# Patient Record
Sex: MALE | Race: WHITE | HISPANIC OR LATINO | Employment: OTHER | URBAN - METROPOLITAN AREA
[De-identification: names, ages, dates, MRNs, and addresses within clinical notes are randomized per-mention and may not be internally consistent; named-entity substitution may affect disease eponyms.]

---

## 2024-07-21 ENCOUNTER — APPOINTMENT (OUTPATIENT)
Dept: CT IMAGING | Facility: HOSPITAL | Age: 34
End: 2024-07-21

## 2024-07-21 ENCOUNTER — HOSPITAL ENCOUNTER (INPATIENT)
Facility: HOSPITAL | Age: 34
LOS: 4 days | Discharge: HOME | End: 2024-07-25
Attending: EMERGENCY MEDICINE | Admitting: INTERNAL MEDICINE

## 2024-07-21 ENCOUNTER — APPOINTMENT (OUTPATIENT)
Dept: GENERAL RADIOLOGY | Facility: HOSPITAL | Age: 34
End: 2024-07-21

## 2024-07-21 ENCOUNTER — APPOINTMENT (OUTPATIENT)
Dept: NEUROLOGY | Facility: HOSPITAL | Age: 34
End: 2024-07-21

## 2024-07-21 DIAGNOSIS — R41.82 ALTERED MENTAL STATUS, UNSPECIFIED ALTERED MENTAL STATUS TYPE: Primary | ICD-10-CM

## 2024-07-21 DIAGNOSIS — F19.10 POLYSUBSTANCE ABUSE: ICD-10-CM

## 2024-07-21 DIAGNOSIS — F10.929 ALCOHOLIC INTOXICATION WITH COMPLICATION: ICD-10-CM

## 2024-07-21 DIAGNOSIS — E87.29 HIGH ANION GAP METABOLIC ACIDOSIS: ICD-10-CM

## 2024-07-21 DIAGNOSIS — J18.9 COMMUNITY ACQUIRED PNEUMONIA, UNSPECIFIED LATERALITY: ICD-10-CM

## 2024-07-21 LAB
ALBUMIN SERPL-MCNC: 4.4 G/DL (ref 3.5–5.2)
ALBUMIN/GLOB SERPL: 1.4 G/DL
ALP SERPL-CCNC: 96 U/L (ref 39–117)
ALT SERPL W P-5'-P-CCNC: 45 U/L (ref 1–41)
AMPHET+METHAMPHET UR QL: NEGATIVE
ANION GAP SERPL CALCULATED.3IONS-SCNC: 24.3 MMOL/L (ref 5–15)
APAP SERPL-MCNC: <5 MCG/ML (ref 0–30)
APPEARANCE CSF: CLEAR
APPEARANCE CSF: CLEAR
ARTERIAL PATENCY WRIST A: POSITIVE
ARTERIAL PATENCY WRIST A: POSITIVE
AST SERPL-CCNC: 46 U/L (ref 1–40)
ATMOSPHERIC PRESS: 749.2 MMHG
ATMOSPHERIC PRESS: 750.9 MMHG
B PARAPERT DNA SPEC QL NAA+PROBE: NOT DETECTED
B PERT DNA SPEC QL NAA+PROBE: NOT DETECTED
BACTERIA UR QL AUTO: NORMAL /HPF
BARBITURATES UR QL SCN: NEGATIVE
BASE EXCESS BLDA CALC-SCNC: -12.4 MMOL/L (ref 0–2)
BASE EXCESS BLDA CALC-SCNC: -7.9 MMOL/L (ref 0–2)
BASOPHILS # BLD AUTO: 0.12 10*3/MM3 (ref 0–0.2)
BASOPHILS NFR BLD AUTO: 0.7 % (ref 0–1.5)
BDY SITE: ABNORMAL
BDY SITE: ABNORMAL
BENZODIAZ UR QL SCN: POSITIVE
BILIRUB SERPL-MCNC: 0.3 MG/DL (ref 0–1.2)
BILIRUB UR QL STRIP: NEGATIVE
BUN SERPL-MCNC: 8 MG/DL (ref 8–23)
BUN/CREAT SERPL: 7.3 (ref 7–25)
C PNEUM DNA NPH QL NAA+NON-PROBE: NOT DETECTED
CALCIUM SPEC-SCNC: 8 MG/DL (ref 8.2–9.6)
CANNABINOIDS SERPL QL: NEGATIVE
CHLORIDE SERPL-SCNC: 103 MMOL/L (ref 98–107)
CLARITY UR: CLEAR
CO2 BLDA-SCNC: 19.3 MMOL/L (ref 23–27)
CO2 BLDA-SCNC: 20.4 MMOL/L (ref 23–27)
CO2 SERPL-SCNC: 13.7 MMOL/L (ref 22–29)
COCAINE UR QL: POSITIVE
COLOR CSF: COLORLESS
COLOR CSF: COLORLESS
COLOR UR: YELLOW
CREAT SERPL-MCNC: 1.09 MG/DL (ref 0.76–1.27)
D-LACTATE SERPL-SCNC: 3.1 MMOL/L (ref 0.5–2)
D-LACTATE SERPL-SCNC: 3.5 MMOL/L (ref 0.5–2)
D-LACTATE SERPL-SCNC: 3.6 MMOL/L (ref 0.5–2)
D-LACTATE SERPL-SCNC: 9 MMOL/L (ref 0.5–2)
DEPRECATED RDW RBC AUTO: 44.6 FL (ref 37–54)
DEVICE COMMENT: ABNORMAL
DEVICE COMMENT: ABNORMAL
EGFRCR SERPLBLD CKD-EPI 2021: 36.2 ML/MIN/1.73
EOSINOPHIL # BLD AUTO: 0.16 10*3/MM3 (ref 0–0.4)
EOSINOPHIL NFR BLD AUTO: 0.9 % (ref 0.3–6.2)
ERYTHROCYTE [DISTWIDTH] IN BLOOD BY AUTOMATED COUNT: 13.1 % (ref 12.3–15.4)
ETHANOL BLD-MCNC: 183 MG/DL (ref 0–10)
ETHANOL UR QL: 0.18 %
FENTANYL UR-MCNC: POSITIVE NG/ML
FLUAV SUBTYP SPEC NAA+PROBE: NOT DETECTED
FLUBV RNA ISLT QL NAA+PROBE: NOT DETECTED
GLOBULIN UR ELPH-MCNC: 3.1 GM/DL
GLUCOSE BLDC GLUCOMTR-MCNC: 103 MG/DL (ref 70–130)
GLUCOSE BLDC GLUCOMTR-MCNC: 149 MG/DL (ref 70–130)
GLUCOSE BLDC GLUCOMTR-MCNC: 336 MG/DL (ref 70–130)
GLUCOSE BLDC GLUCOMTR-MCNC: 98 MG/DL (ref 70–130)
GLUCOSE CSF-MCNC: 95 MG/DL (ref 40–70)
GLUCOSE SERPL-MCNC: 378 MG/DL (ref 65–99)
GLUCOSE UR STRIP-MCNC: ABNORMAL MG/DL
HADV DNA SPEC NAA+PROBE: NOT DETECTED
HBA1C MFR BLD: 6.2 % (ref 4.8–5.6)
HCO3 BLDA-SCNC: 17.5 MMOL/L (ref 22–28)
HCO3 BLDA-SCNC: 19.1 MMOL/L (ref 22–28)
HCOV 229E RNA SPEC QL NAA+PROBE: NOT DETECTED
HCOV HKU1 RNA SPEC QL NAA+PROBE: NOT DETECTED
HCOV NL63 RNA SPEC QL NAA+PROBE: NOT DETECTED
HCOV OC43 RNA SPEC QL NAA+PROBE: NOT DETECTED
HCT VFR BLD AUTO: 45.7 % (ref 37.5–51)
HEMODILUTION: NO
HEMODILUTION: NO
HGB BLD-MCNC: 14.8 G/DL (ref 13–17.7)
HGB UR QL STRIP.AUTO: ABNORMAL
HMPV RNA NPH QL NAA+NON-PROBE: NOT DETECTED
HOLD SPECIMEN: NORMAL
HOLD SPECIMEN: NORMAL
HPIV1 RNA ISLT QL NAA+PROBE: NOT DETECTED
HPIV2 RNA SPEC QL NAA+PROBE: NOT DETECTED
HPIV3 RNA NPH QL NAA+PROBE: NOT DETECTED
HPIV4 P GENE NPH QL NAA+PROBE: NOT DETECTED
HYALINE CASTS UR QL AUTO: NORMAL /LPF
IMM GRANULOCYTES # BLD AUTO: 0.8 10*3/MM3 (ref 0–0.05)
IMM GRANULOCYTES NFR BLD AUTO: 4.7 % (ref 0–0.5)
INHALED O2 CONCENTRATION: 100 %
INHALED O2 CONCENTRATION: 100 %
INR PPP: 1.11 (ref 0.9–1.1)
KETONES UR QL STRIP: NEGATIVE
LEUKOCYTE ESTERASE UR QL STRIP.AUTO: NEGATIVE
LYMPHOCYTES # BLD AUTO: 7.68 10*3/MM3 (ref 0.7–3.1)
LYMPHOCYTES NFR BLD AUTO: 45.3 % (ref 19.6–45.3)
Lab: ABNORMAL
Lab: ABNORMAL
M PNEUMO IGG SER IA-ACNC: NOT DETECTED
MAGNESIUM SERPL-MCNC: 2.4 MG/DL (ref 1.7–2.3)
MCH RBC QN AUTO: 30.2 PG (ref 26.6–33)
MCHC RBC AUTO-ENTMCNC: 32.4 G/DL (ref 31.5–35.7)
MCV RBC AUTO: 93.3 FL (ref 79–97)
METHADONE UR QL SCN: NEGATIVE
MODALITY: ABNORMAL
MODALITY: ABNORMAL
MONOCYTES # BLD AUTO: 0.57 10*3/MM3 (ref 0.1–0.9)
MONOCYTES NFR BLD AUTO: 3.4 % (ref 5–12)
NEUTROPHILS NFR BLD AUTO: 45 % (ref 42.7–76)
NEUTROPHILS NFR BLD AUTO: 7.63 10*3/MM3 (ref 1.7–7)
NITRITE UR QL STRIP: NEGATIVE
NOTIFIED WHO: ABNORMAL
NOTIFIED WHO: ABNORMAL
NRBC BLD AUTO-RTO: 0 /100 WBC (ref 0–0.2)
NUC CELL # CSF MANUAL: 3 /MM3 (ref 0–5)
NUC CELL # CSF MANUAL: 3 /MM3 (ref 0–5)
O2 A-A PPRESDIFF RESPIRATORY: 0.4 MMHG
O2 A-A PPRESDIFF RESPIRATORY: 0.5 MMHG
OPIATES UR QL: NEGATIVE
OXYCODONE UR QL SCN: NEGATIVE
PCO2 BLDA: 43.1 MM HG (ref 35–45)
PCO2 BLDA: 56.8 MM HG (ref 35–45)
PEEP RESPIRATORY: 5 CM[H2O]
PEEP RESPIRATORY: 5 CM[H2O]
PH BLDA: 7.1 PH UNITS (ref 7.35–7.45)
PH BLDA: 7.25 PH UNITS (ref 7.35–7.45)
PH UR STRIP.AUTO: 5.5 [PH] (ref 5–8)
PLATELET # BLD AUTO: 306 10*3/MM3 (ref 140–450)
PMV BLD AUTO: 10.7 FL (ref 6–12)
PO2 BLD: 300 MM[HG] (ref 0–500)
PO2 BLD: 355 MM[HG] (ref 0–500)
PO2 BLDA: 300.2 MM HG (ref 80–100)
PO2 BLDA: 355.1 MM HG (ref 80–100)
POTASSIUM SERPL-SCNC: 3.4 MMOL/L (ref 3.5–5.2)
PROT CSF-MCNC: 34.3 MG/DL (ref 15–45)
PROT SERPL-MCNC: 7.5 G/DL (ref 6–8.5)
PROT UR QL STRIP: ABNORMAL
PROTHROMBIN TIME: 14.5 SECONDS (ref 11.7–14.2)
RBC # BLD AUTO: 4.9 10*6/MM3 (ref 4.14–5.8)
RBC # CSF MANUAL: 9 /MM3 (ref 0–0)
RBC # CSF MANUAL: 9 /MM3 (ref 0–0)
RBC # UR STRIP: NORMAL /HPF
READ BACK: YES
READ BACK: YES
REF LAB TEST METHOD: NORMAL
RHINOVIRUS RNA SPEC NAA+PROBE: NOT DETECTED
RSV RNA NPH QL NAA+NON-PROBE: NOT DETECTED
SALICYLATES SERPL-MCNC: <0.3 MG/DL
SAO2 % BLDCOA: 99.8 % (ref 92–98.5)
SAO2 % BLDCOA: 99.9 % (ref 92–98.5)
SARS-COV-2 RNA NPH QL NAA+NON-PROBE: NOT DETECTED
SET MECH RESP RATE: 18
SET MECH RESP RATE: 20
SODIUM SERPL-SCNC: 141 MMOL/L (ref 136–145)
SP GR UR STRIP: 1.01 (ref 1–1.03)
SQUAMOUS #/AREA URNS HPF: NORMAL /HPF
TOTAL RATE: 18 BREATHS/MINUTE
TOTAL RATE: 20 BREATHS/MINUTE
TROPONIN T SERPL HS-MCNC: 12 NG/L
TUBE # CSF: 2
TUBE # CSF: 4
UROBILINOGEN UR QL STRIP: ABNORMAL
VENTILATOR MODE: ABNORMAL
VENTILATOR MODE: AC
VT ON VENT VENT: 550 ML
VT ON VENT VENT: 566 ML
WBC # UR STRIP: NORMAL /HPF
WBC NRBC COR # BLD AUTO: 16.96 10*3/MM3 (ref 3.4–10.8)
WHOLE BLOOD HOLD COAG: NORMAL
WHOLE BLOOD HOLD SPECIMEN: NORMAL

## 2024-07-21 PROCEDURE — 80307 DRUG TEST PRSMV CHEM ANLYZR: CPT | Performed by: EMERGENCY MEDICINE

## 2024-07-21 PROCEDURE — 82945 GLUCOSE OTHER FLUID: CPT | Performed by: PSYCHIATRY & NEUROLOGY

## 2024-07-21 PROCEDURE — 25010000002 CEFTRIAXONE PER 250 MG: Performed by: INTERNAL MEDICINE

## 2024-07-21 PROCEDURE — 80179 DRUG ASSAY SALICYLATE: CPT | Performed by: EMERGENCY MEDICINE

## 2024-07-21 PROCEDURE — 82803 BLOOD GASES ANY COMBINATION: CPT

## 2024-07-21 PROCEDURE — 25010000002 HALOPERIDOL LACTATE PER 5 MG

## 2024-07-21 PROCEDURE — 71045 X-RAY EXAM CHEST 1 VIEW: CPT

## 2024-07-21 PROCEDURE — 25010000002 LORAZEPAM PER 2 MG: Performed by: INTERNAL MEDICINE

## 2024-07-21 PROCEDURE — 93010 ELECTROCARDIOGRAM REPORT: CPT | Performed by: INTERNAL MEDICINE

## 2024-07-21 PROCEDURE — 80053 COMPREHEN METABOLIC PANEL: CPT

## 2024-07-21 PROCEDURE — 99291 CRITICAL CARE FIRST HOUR: CPT

## 2024-07-21 PROCEDURE — 25810000003 SODIUM CHLORIDE 0.9 % SOLUTION 250 ML FLEX CONT: Performed by: EMERGENCY MEDICINE

## 2024-07-21 PROCEDURE — 25810000003 SODIUM CHLORIDE 0.9 % SOLUTION: Performed by: INTERNAL MEDICINE

## 2024-07-21 PROCEDURE — 62270 DX LMBR SPI PNXR: CPT | Performed by: PSYCHIATRY & NEUROLOGY

## 2024-07-21 PROCEDURE — 36600 WITHDRAWAL OF ARTERIAL BLOOD: CPT

## 2024-07-21 PROCEDURE — 94799 UNLISTED PULMONARY SVC/PX: CPT

## 2024-07-21 PROCEDURE — 5A1945Z RESPIRATORY VENTILATION, 24-96 CONSECUTIVE HOURS: ICD-10-PCS | Performed by: INTERNAL MEDICINE

## 2024-07-21 PROCEDURE — 93005 ELECTROCARDIOGRAM TRACING: CPT

## 2024-07-21 PROCEDURE — 84484 ASSAY OF TROPONIN QUANT: CPT

## 2024-07-21 PROCEDURE — 25010000002 MIDAZOLAM PER 1 MG

## 2024-07-21 PROCEDURE — 25010000002 PROPOFOL 10 MG/ML EMULSION: Performed by: INTERNAL MEDICINE

## 2024-07-21 PROCEDURE — 83605 ASSAY OF LACTIC ACID: CPT | Performed by: EMERGENCY MEDICINE

## 2024-07-21 PROCEDURE — 89050 BODY FLUID CELL COUNT: CPT | Performed by: PSYCHIATRY & NEUROLOGY

## 2024-07-21 PROCEDURE — 25010000002 THIAMINE HCL 200 MG/2ML SOLUTION 2 ML VIAL: Performed by: EMERGENCY MEDICINE

## 2024-07-21 PROCEDURE — 99253 IP/OBS CNSLTJ NEW/EST LOW 45: CPT | Performed by: PSYCHIATRY & NEUROLOGY

## 2024-07-21 PROCEDURE — 25010000002 AZITHROMYCIN PER 500 MG: Performed by: EMERGENCY MEDICINE

## 2024-07-21 PROCEDURE — 81001 URINALYSIS AUTO W/SCOPE: CPT

## 2024-07-21 PROCEDURE — 83735 ASSAY OF MAGNESIUM: CPT

## 2024-07-21 PROCEDURE — 70450 CT HEAD/BRAIN W/O DYE: CPT

## 2024-07-21 PROCEDURE — 25010000002 FENTANYL CITRATE (PF) 50 MCG/ML SOLUTION: Performed by: INTERNAL MEDICINE

## 2024-07-21 PROCEDURE — 25010000002 FENTANYL CITRATE (PF) 50 MCG/ML SOLUTION: Performed by: EMERGENCY MEDICINE

## 2024-07-21 PROCEDURE — 25010000002 THIAMINE HCL 200 MG/2ML SOLUTION 2 ML VIAL: Performed by: INTERNAL MEDICINE

## 2024-07-21 PROCEDURE — 0202U NFCT DS 22 TRGT SARS-COV-2: CPT | Performed by: EMERGENCY MEDICINE

## 2024-07-21 PROCEDURE — 009U3ZX DRAINAGE OF SPINAL CANAL, PERCUTANEOUS APPROACH, DIAGNOSTIC: ICD-10-PCS | Performed by: PSYCHIATRY & NEUROLOGY

## 2024-07-21 PROCEDURE — 80143 DRUG ASSAY ACETAMINOPHEN: CPT | Performed by: EMERGENCY MEDICINE

## 2024-07-21 PROCEDURE — 36600 WITHDRAWAL OF ARTERIAL BLOOD: CPT | Performed by: INTERNAL MEDICINE

## 2024-07-21 PROCEDURE — 82948 REAGENT STRIP/BLOOD GLUCOSE: CPT

## 2024-07-21 PROCEDURE — 25810000003 DEXTROSE 5% IN LACTATED RINGERS PER 1000 ML: Performed by: EMERGENCY MEDICINE

## 2024-07-21 PROCEDURE — 51702 INSERT TEMP BLADDER CATH: CPT

## 2024-07-21 PROCEDURE — 85610 PROTHROMBIN TIME: CPT | Performed by: PSYCHIATRY & NEUROLOGY

## 2024-07-21 PROCEDURE — 82803 BLOOD GASES ANY COMBINATION: CPT | Performed by: INTERNAL MEDICINE

## 2024-07-21 PROCEDURE — 0BH17EZ INSERTION OF ENDOTRACHEAL AIRWAY INTO TRACHEA, VIA NATURAL OR ARTIFICIAL OPENING: ICD-10-PCS | Performed by: INTERNAL MEDICINE

## 2024-07-21 PROCEDURE — 25010000002 LEVETRIRACETAM PER 10 MG: Performed by: INTERNAL MEDICINE

## 2024-07-21 PROCEDURE — 36415 COLL VENOUS BLD VENIPUNCTURE: CPT

## 2024-07-21 PROCEDURE — 94761 N-INVAS EAR/PLS OXIMETRY MLT: CPT

## 2024-07-21 PROCEDURE — 82077 ASSAY SPEC XCP UR&BREATH IA: CPT

## 2024-07-21 PROCEDURE — 93005 ELECTROCARDIOGRAM TRACING: CPT | Performed by: INTERNAL MEDICINE

## 2024-07-21 PROCEDURE — 25010000002 CEFTRIAXONE PER 250 MG: Performed by: EMERGENCY MEDICINE

## 2024-07-21 PROCEDURE — 95822 EEG COMA OR SLEEP ONLY: CPT

## 2024-07-21 PROCEDURE — 87205 SMEAR GRAM STAIN: CPT | Performed by: PSYCHIATRY & NEUROLOGY

## 2024-07-21 PROCEDURE — 95822 EEG COMA OR SLEEP ONLY: CPT | Performed by: PSYCHIATRY & NEUROLOGY

## 2024-07-21 PROCEDURE — 25810000003 LACTATED RINGERS SOLUTION: Performed by: EMERGENCY MEDICINE

## 2024-07-21 PROCEDURE — 87040 BLOOD CULTURE FOR BACTERIA: CPT | Performed by: EMERGENCY MEDICINE

## 2024-07-21 PROCEDURE — 85025 COMPLETE CBC W/AUTO DIFF WBC: CPT

## 2024-07-21 PROCEDURE — 94002 VENT MGMT INPAT INIT DAY: CPT

## 2024-07-21 PROCEDURE — 31500 INSERT EMERGENCY AIRWAY: CPT

## 2024-07-21 PROCEDURE — 84157 ASSAY OF PROTEIN OTHER: CPT | Performed by: PSYCHIATRY & NEUROLOGY

## 2024-07-21 PROCEDURE — 87070 CULTURE OTHR SPECIMN AEROBIC: CPT | Performed by: PSYCHIATRY & NEUROLOGY

## 2024-07-21 PROCEDURE — 25010000002 PROPOFOL 10 MG/ML EMULSION: Performed by: EMERGENCY MEDICINE

## 2024-07-21 PROCEDURE — 87015 SPECIMEN INFECT AGNT CONCNTJ: CPT | Performed by: PSYCHIATRY & NEUROLOGY

## 2024-07-21 PROCEDURE — 83036 HEMOGLOBIN GLYCOSYLATED A1C: CPT | Performed by: INTERNAL MEDICINE

## 2024-07-21 RX ORDER — LORAZEPAM 2 MG/ML
2 INJECTION INTRAMUSCULAR
Status: DISCONTINUED | OUTPATIENT
Start: 2024-07-21 | End: 2024-07-25 | Stop reason: HOSPADM

## 2024-07-21 RX ORDER — LORAZEPAM 2 MG/ML
1 INJECTION INTRAMUSCULAR EVERY 6 HOURS
Status: DISPENSED | OUTPATIENT
Start: 2024-07-22 | End: 2024-07-24

## 2024-07-21 RX ORDER — MIDAZOLAM HYDROCHLORIDE 1 MG/ML
5 INJECTION INTRAMUSCULAR; INTRAVENOUS ONCE
Status: COMPLETED | OUTPATIENT
Start: 2024-07-21 | End: 2024-07-21

## 2024-07-21 RX ORDER — SODIUM CHLORIDE 9 MG/ML
40 INJECTION, SOLUTION INTRAVENOUS AS NEEDED
Status: DISCONTINUED | OUTPATIENT
Start: 2024-07-21 | End: 2024-07-25 | Stop reason: HOSPADM

## 2024-07-21 RX ORDER — MIDAZOLAM HYDROCHLORIDE 1 MG/ML
INJECTION INTRAMUSCULAR; INTRAVENOUS
Status: COMPLETED
Start: 2024-07-21 | End: 2024-07-21

## 2024-07-21 RX ORDER — LORAZEPAM 1 MG/1
4 TABLET ORAL
Status: DISCONTINUED | OUTPATIENT
Start: 2024-07-21 | End: 2024-07-25 | Stop reason: HOSPADM

## 2024-07-21 RX ORDER — LEVETIRACETAM 500 MG/5ML
1000 INJECTION, SOLUTION, CONCENTRATE INTRAVENOUS ONCE
Status: COMPLETED | OUTPATIENT
Start: 2024-07-21 | End: 2024-07-21

## 2024-07-21 RX ORDER — FENTANYL CITRATE 50 UG/ML
100 INJECTION, SOLUTION INTRAMUSCULAR; INTRAVENOUS ONCE
Status: COMPLETED | OUTPATIENT
Start: 2024-07-21 | End: 2024-07-21

## 2024-07-21 RX ORDER — ACETAMINOPHEN 325 MG/1
650 TABLET ORAL EVERY 4 HOURS PRN
Status: DISCONTINUED | OUTPATIENT
Start: 2024-07-21 | End: 2024-07-25 | Stop reason: HOSPADM

## 2024-07-21 RX ORDER — ACETAMINOPHEN 650 MG/1
650 SUPPOSITORY RECTAL EVERY 4 HOURS PRN
Status: DISCONTINUED | OUTPATIENT
Start: 2024-07-21 | End: 2024-07-25 | Stop reason: HOSPADM

## 2024-07-21 RX ORDER — DEXTROSE, SODIUM CHLORIDE, SODIUM LACTATE, POTASSIUM CHLORIDE, AND CALCIUM CHLORIDE 5; .6; .31; .03; .02 G/100ML; G/100ML; G/100ML; G/100ML; G/100ML
100 INJECTION, SOLUTION INTRAVENOUS CONTINUOUS
Status: DISCONTINUED | OUTPATIENT
Start: 2024-07-21 | End: 2024-07-22

## 2024-07-21 RX ORDER — BISACODYL 10 MG
10 SUPPOSITORY, RECTAL RECTAL DAILY PRN
Status: DISCONTINUED | OUTPATIENT
Start: 2024-07-21 | End: 2024-07-25 | Stop reason: HOSPADM

## 2024-07-21 RX ORDER — FAMOTIDINE 10 MG/ML
20 INJECTION, SOLUTION INTRAVENOUS 2 TIMES DAILY
Status: DISCONTINUED | OUTPATIENT
Start: 2024-07-21 | End: 2024-07-23

## 2024-07-21 RX ORDER — ONDANSETRON 4 MG/1
4 TABLET, ORALLY DISINTEGRATING ORAL EVERY 6 HOURS PRN
Status: DISCONTINUED | OUTPATIENT
Start: 2024-07-21 | End: 2024-07-25 | Stop reason: HOSPADM

## 2024-07-21 RX ORDER — BISACODYL 5 MG/1
5 TABLET, DELAYED RELEASE ORAL DAILY PRN
Status: DISCONTINUED | OUTPATIENT
Start: 2024-07-21 | End: 2024-07-25 | Stop reason: HOSPADM

## 2024-07-21 RX ORDER — LORAZEPAM 2 MG/ML
4 INJECTION INTRAMUSCULAR
Status: DISCONTINUED | OUTPATIENT
Start: 2024-07-21 | End: 2024-07-25 | Stop reason: HOSPADM

## 2024-07-21 RX ORDER — AMOXICILLIN 250 MG
2 CAPSULE ORAL 2 TIMES DAILY
Status: DISCONTINUED | OUTPATIENT
Start: 2024-07-21 | End: 2024-07-25 | Stop reason: HOSPADM

## 2024-07-21 RX ORDER — IBUPROFEN 600 MG/1
1 TABLET ORAL
Status: DISCONTINUED | OUTPATIENT
Start: 2024-07-21 | End: 2024-07-22

## 2024-07-21 RX ORDER — LORAZEPAM 1 MG/1
1 TABLET ORAL
Status: DISCONTINUED | OUTPATIENT
Start: 2024-07-21 | End: 2024-07-25 | Stop reason: HOSPADM

## 2024-07-21 RX ORDER — DEXTROSE MONOHYDRATE 25 G/50ML
25 INJECTION, SOLUTION INTRAVENOUS
Status: DISCONTINUED | OUTPATIENT
Start: 2024-07-21 | End: 2024-07-22

## 2024-07-21 RX ORDER — PROPOFOL 10 MG/ML
100 VIAL (ML) INTRAVENOUS ONCE
Status: COMPLETED | OUTPATIENT
Start: 2024-07-21 | End: 2024-07-21

## 2024-07-21 RX ORDER — LORAZEPAM 1 MG/1
2 TABLET ORAL
Status: DISCONTINUED | OUTPATIENT
Start: 2024-07-21 | End: 2024-07-25 | Stop reason: HOSPADM

## 2024-07-21 RX ORDER — FENTANYL CITRATE 50 UG/ML
75 INJECTION, SOLUTION INTRAMUSCULAR; INTRAVENOUS
Status: DISCONTINUED | OUTPATIENT
Start: 2024-07-21 | End: 2024-07-22

## 2024-07-21 RX ORDER — SODIUM CHLORIDE 0.9 % (FLUSH) 0.9 %
10 SYRINGE (ML) INJECTION AS NEEDED
Status: DISCONTINUED | OUTPATIENT
Start: 2024-07-21 | End: 2024-07-25 | Stop reason: HOSPADM

## 2024-07-21 RX ORDER — POLYETHYLENE GLYCOL 3350 17 G/17G
17 POWDER, FOR SOLUTION ORAL DAILY PRN
Status: DISCONTINUED | OUTPATIENT
Start: 2024-07-21 | End: 2024-07-25 | Stop reason: HOSPADM

## 2024-07-21 RX ORDER — HALOPERIDOL 5 MG/ML
INJECTION INTRAMUSCULAR
Status: COMPLETED
Start: 2024-07-21 | End: 2024-07-21

## 2024-07-21 RX ORDER — ONDANSETRON 2 MG/ML
4 INJECTION INTRAMUSCULAR; INTRAVENOUS EVERY 6 HOURS PRN
Status: DISCONTINUED | OUTPATIENT
Start: 2024-07-21 | End: 2024-07-25 | Stop reason: HOSPADM

## 2024-07-21 RX ORDER — LORAZEPAM 2 MG/ML
1 INJECTION INTRAMUSCULAR
Status: DISCONTINUED | OUTPATIENT
Start: 2024-07-21 | End: 2024-07-25 | Stop reason: HOSPADM

## 2024-07-21 RX ORDER — ROCURONIUM BROMIDE 10 MG/ML
1 INJECTION, SOLUTION INTRAVENOUS ONCE
Status: COMPLETED | OUTPATIENT
Start: 2024-07-21 | End: 2024-07-21

## 2024-07-21 RX ORDER — LORAZEPAM 2 MG/ML
2 INJECTION INTRAMUSCULAR EVERY 6 HOURS
Status: COMPLETED | OUTPATIENT
Start: 2024-07-21 | End: 2024-07-22

## 2024-07-21 RX ORDER — THIAMINE HYDROCHLORIDE 100 MG/ML
200 INJECTION, SOLUTION INTRAMUSCULAR; INTRAVENOUS EVERY 8 HOURS SCHEDULED
Status: DISCONTINUED | OUTPATIENT
Start: 2024-07-24 | End: 2024-07-25 | Stop reason: HOSPADM

## 2024-07-21 RX ORDER — NICOTINE POLACRILEX 4 MG
15 LOZENGE BUCCAL
Status: DISCONTINUED | OUTPATIENT
Start: 2024-07-21 | End: 2024-07-22

## 2024-07-21 RX ORDER — CHLORHEXIDINE GLUCONATE ORAL RINSE 1.2 MG/ML
15 SOLUTION DENTAL EVERY 12 HOURS SCHEDULED
Status: DISCONTINUED | OUTPATIENT
Start: 2024-07-21 | End: 2024-07-22

## 2024-07-21 RX ORDER — NITROGLYCERIN 0.4 MG/1
0.4 TABLET SUBLINGUAL
Status: DISCONTINUED | OUTPATIENT
Start: 2024-07-21 | End: 2024-07-25 | Stop reason: HOSPADM

## 2024-07-21 RX ORDER — HALOPERIDOL 5 MG/ML
5 INJECTION INTRAMUSCULAR ONCE
Status: COMPLETED | OUTPATIENT
Start: 2024-07-21 | End: 2024-07-21

## 2024-07-21 RX ORDER — ETOMIDATE 2 MG/ML
0.3 INJECTION INTRAVENOUS ONCE
Status: COMPLETED | OUTPATIENT
Start: 2024-07-21 | End: 2024-07-21

## 2024-07-21 RX ADMIN — ACETAMINOPHEN 650 MG: 650 SUPPOSITORY RECTAL at 18:05

## 2024-07-21 RX ADMIN — CEFTRIAXONE 2000 MG: 2 INJECTION, POWDER, FOR SOLUTION INTRAMUSCULAR; INTRAVENOUS at 12:02

## 2024-07-21 RX ADMIN — SODIUM CHLORIDE, POTASSIUM CHLORIDE, SODIUM LACTATE AND CALCIUM CHLORIDE 500 ML: 600; 310; 30; 20 INJECTION, SOLUTION INTRAVENOUS at 10:30

## 2024-07-21 RX ADMIN — FENTANYL CITRATE 100 MCG: 50 INJECTION, SOLUTION INTRAMUSCULAR; INTRAVENOUS at 08:30

## 2024-07-21 RX ADMIN — LORAZEPAM 2 MG: 2 INJECTION INTRAMUSCULAR; INTRAVENOUS at 12:03

## 2024-07-21 RX ADMIN — FAMOTIDINE 20 MG: 10 INJECTION INTRAVENOUS at 21:10

## 2024-07-21 RX ADMIN — THIAMINE HYDROCHLORIDE 500 MG: 100 INJECTION, SOLUTION INTRAMUSCULAR; INTRAVENOUS at 22:10

## 2024-07-21 RX ADMIN — 0.12% CHLORHEXIDINE GLUCONATE 15 ML: 1.2 RINSE ORAL at 12:03

## 2024-07-21 RX ADMIN — FAMOTIDINE 20 MG: 10 INJECTION INTRAVENOUS at 12:04

## 2024-07-21 RX ADMIN — PROPOFOL INJECTABLE EMULSION 50 MCG/KG/MIN: 10 INJECTION, EMULSION INTRAVENOUS at 22:10

## 2024-07-21 RX ADMIN — MIDAZOLAM HYDROCHLORIDE 5 MG: 1 INJECTION INTRAMUSCULAR; INTRAVENOUS at 07:45

## 2024-07-21 RX ADMIN — ACETAMINOPHEN 650 MG: 650 SUPPOSITORY RECTAL at 22:11

## 2024-07-21 RX ADMIN — FOLIC ACID 1 MG: 5 INJECTION, SOLUTION INTRAMUSCULAR; INTRAVENOUS; SUBCUTANEOUS at 13:56

## 2024-07-21 RX ADMIN — LEVETIRACETAM 1000 MG: 100 INJECTION INTRAVENOUS at 13:01

## 2024-07-21 RX ADMIN — MIDAZOLAM HYDROCHLORIDE 5 MG: 1 INJECTION, SOLUTION INTRAMUSCULAR; INTRAVENOUS at 07:45

## 2024-07-21 RX ADMIN — PROPOFOL INJECTABLE EMULSION 50 MCG/KG/MIN: 10 INJECTION, EMULSION INTRAVENOUS at 13:52

## 2024-07-21 RX ADMIN — 0.12% CHLORHEXIDINE GLUCONATE 15 ML: 1.2 RINSE ORAL at 21:10

## 2024-07-21 RX ADMIN — SODIUM CHLORIDE, POTASSIUM CHLORIDE, SODIUM LACTATE AND CALCIUM CHLORIDE 1000 ML: 600; 310; 30; 20 INJECTION, SOLUTION INTRAVENOUS at 07:50

## 2024-07-21 RX ADMIN — ETOMIDATE 20.4 MG: 40 INJECTION, SOLUTION INTRAVENOUS at 08:09

## 2024-07-21 RX ADMIN — LORAZEPAM 2 MG: 2 INJECTION INTRAMUSCULAR; INTRAVENOUS at 18:14

## 2024-07-21 RX ADMIN — LORAZEPAM 2 MG: 2 INJECTION, SOLUTION INTRAMUSCULAR; INTRAVENOUS at 16:11

## 2024-07-21 RX ADMIN — CEFTRIAXONE 2000 MG: 2 INJECTION, POWDER, FOR SOLUTION INTRAMUSCULAR; INTRAVENOUS at 23:43

## 2024-07-21 RX ADMIN — THIAMINE HYDROCHLORIDE 500 MG: 100 INJECTION, SOLUTION INTRAMUSCULAR; INTRAVENOUS at 08:36

## 2024-07-21 RX ADMIN — FENTANYL CITRATE 75 MCG: 50 INJECTION, SOLUTION INTRAMUSCULAR; INTRAVENOUS at 21:29

## 2024-07-21 RX ADMIN — HALOPERIDOL LACTATE 5 MG: 5 INJECTION, SOLUTION INTRAMUSCULAR at 07:45

## 2024-07-21 RX ADMIN — THIAMINE HYDROCHLORIDE 500 MG: 100 INJECTION, SOLUTION INTRAMUSCULAR; INTRAVENOUS at 14:56

## 2024-07-21 RX ADMIN — PROPOFOL INJECTABLE EMULSION 50 MCG/KG/MIN: 10 INJECTION, EMULSION INTRAVENOUS at 18:18

## 2024-07-21 RX ADMIN — FENTANYL CITRATE 75 MCG: 50 INJECTION, SOLUTION INTRAMUSCULAR; INTRAVENOUS at 16:10

## 2024-07-21 RX ADMIN — PROPOFOL INJECTABLE EMULSION 30 MCG/KG/MIN: 10 INJECTION, EMULSION INTRAVENOUS at 10:17

## 2024-07-21 RX ADMIN — SODIUM CHLORIDE, POTASSIUM CHLORIDE, SODIUM LACTATE AND CALCIUM CHLORIDE 1000 ML: 600; 310; 30; 20 INJECTION, SOLUTION INTRAVENOUS at 10:30

## 2024-07-21 RX ADMIN — SODIUM CHLORIDE, SODIUM LACTATE, POTASSIUM CHLORIDE, CALCIUM CHLORIDE AND DEXTROSE MONOHYDRATE 100 ML/HR: 5; 600; 310; 30; 20 INJECTION, SOLUTION INTRAVENOUS at 22:16

## 2024-07-21 RX ADMIN — PROPOFOL INJECTABLE EMULSION 5 MCG/KG/MIN: 10 INJECTION, EMULSION INTRAVENOUS at 08:38

## 2024-07-21 RX ADMIN — SODIUM CHLORIDE 1000 ML: 9 INJECTION, SOLUTION INTRAVENOUS at 14:57

## 2024-07-21 RX ADMIN — SODIUM CHLORIDE, SODIUM LACTATE, POTASSIUM CHLORIDE, CALCIUM CHLORIDE AND DEXTROSE MONOHYDRATE 100 ML/HR: 5; 600; 310; 30; 20 INJECTION, SOLUTION INTRAVENOUS at 08:29

## 2024-07-21 RX ADMIN — AZITHROMYCIN MONOHYDRATE 500 MG: 500 INJECTION, POWDER, LYOPHILIZED, FOR SOLUTION INTRAVENOUS at 19:02

## 2024-07-21 RX ADMIN — ROCURONIUM BROMIDE 70 MG: 50 INJECTION, SOLUTION INTRAVENOUS at 08:11

## 2024-07-21 RX ADMIN — PROPOFOL 100 MG: 10 INJECTION, EMULSION INTRAVENOUS at 10:44

## 2024-07-21 RX ADMIN — HALOPERIDOL 5 MG: 5 INJECTION INTRAMUSCULAR at 07:45

## 2024-07-21 RX ADMIN — LORAZEPAM 2 MG: 2 INJECTION INTRAMUSCULAR; INTRAVENOUS at 23:42

## 2024-07-21 NOTE — PROCEDURES
LUMBAR PUNCTURE PROCEDURE NOTE    Procedure Date: 7/21/2024      Clinical History:  Unknown patient found down unresponsive with temperature 102.6. Procedure performed emergently without patient or family consent as patient is unresponsive and no family is present due to unknown identity.    Medications:  Patient is pre-medicated via ICU protocols on propofol with additional fentanyl for procedure.     Procedure:    After timeout performed, the patient is placed in the lateral decubitus position with nursing assistance.  The L4-L5 interspace is anesthetized with 1% lidocaine and a 20-gauge spinal needle inserted on second pass into the subarachnoid space. Pink tinged fluid returns. First tube is discarded  Approximately 10 mL of clear fluid is withdrawn and sent to the lab for protein, glucose, cell count, Gram stain and culture.    EBL: < 1 cc    Ashley Wilkins MD

## 2024-07-21 NOTE — ED TRIAGE NOTES
"[facility]. Patient was at \"[facility] in [facility]. Patient received 2mg of narcan in route. Patient was found unresponsive with another person, the other individual ran away and was intoxicated. Patient is unresponsive. Patient is hypotensive.   "

## 2024-07-21 NOTE — ED NOTES
Nursing report ED to floor  Shelby HENDRICKS Hicks  89 y.o.  male    HPI :  HPI (Adult)  Stated Reason for Visit: AMS  History Obtained From: patient    Chief Complaint  Chief Complaint   Patient presents with    Altered Mental Status       Admitting doctor:   Frank Browne MD    Admitting diagnosis:   The primary encounter diagnosis was Altered mental status, unspecified altered mental status type. Diagnoses of Alcoholic intoxication with complication, High anion gap metabolic acidosis, and Community acquired pneumonia, unspecified laterality were also pertinent to this visit.    Code status:   Current Code Status       Date Active Code Status Order ID Comments User Context       7/21/2024 0835 CPR (Attempt to Resuscitate) [id number]  Frank Browne MD ED        Question Answer    Code Status (Patient has no pulse and is not breathing) CPR (Attempt to Resuscitate)    Medical Interventions (Patient has pulse or is breathing) Full Support                    Allergies:   Patient has no allergy information on record.    Isolation:   No active isolations    Intake and Output  No intake or output data in the 24 hours ending 07/21/24 0939    Weight:       07/21/24  0746   Weight: 68 kg (149 lb 14.4 oz)       Most recent vitals:   Vitals:    07/21/24 0906 07/21/24 0911 07/21/24 0916 07/21/24 0926   BP: 120/74 117/74 119/77 126/82   BP Location:       Patient Position:       Pulse: 84 82 82 82   Resp:       SpO2: 92% 93% 93% 93%   Weight:           Active LDAs/IV Access:   Lines, Drains & Airways       Active LDAs       Name Placement date Placement time Site Days    Peripheral IV 07/21/24 0741 Right Antecubital 07/21/24  0741  Antecubital  less than 1    Peripheral IV 07/21/24 0741 Left Antecubital 07/21/24  0741  Antecubital  less than 1    Urethral Catheter Silicone 16 Fr. 07/21/24  0834  -- less than 1    ETT  07/21/24  0813  -- less than 1                    Labs (abnormal labs have a star):   Labs Reviewed   COMPREHENSIVE  METABOLIC PANEL - Abnormal; Notable for the following components:       Result Value    Glucose 378 (*)     Potassium 3.4 (*)     CO2 13.7 (*)     Calcium 8.0 (*)     ALT (SGPT) 45 (*)     AST (SGOT) 46 (*)     Anion Gap 24.3 (*)     eGFR 36.2 (*)     All other components within normal limits    Narrative:     GFR Normal >60  Chronic Kidney Disease <60  Kidney Failure <15    The GFR formula is only valid for adults with stable renal function between ages 18 and 70.   MAGNESIUM - Abnormal; Notable for the following components:    Magnesium 2.4 (*)     All other components within normal limits   URINALYSIS W/ MICROSCOPIC IF INDICATED (NO CULTURE) - Abnormal; Notable for the following components:    Glucose,  mg/dL (Trace) (*)     Blood, UA Trace (*)     Protein,  mg/dL (2+) (*)     All other components within normal limits   CBC WITH AUTO DIFFERENTIAL - Abnormal; Notable for the following components:    WBC 16.96 (*)     Monocyte % 3.4 (*)     Immature Grans % 4.7 (*)     Neutrophils, Absolute 7.63 (*)     Lymphocytes, Absolute 7.68 (*)     Immature Grans, Absolute 0.80 (*)     All other components within normal limits   ETHANOL - Abnormal; Notable for the following components:    Ethanol 183 (*)     All other components within normal limits   URINE DRUG SCREEN - Abnormal; Notable for the following components:    Benzodiazepine Screen, Urine Positive (*)     Cocaine Screen, Urine Positive (*)     Fentanyl, Urine Positive (*)     All other components within normal limits    Narrative:     Negative Thresholds Per Drugs Screened:    Amphetamines                 500 ng/ml  Barbiturates                 200 ng/ml  Benzodiazepines              100 ng/ml  Cocaine                      300 ng/ml  Methadone                    300 ng/ml  Opiates                      300 ng/ml  Oxycodone                    100 ng/ml  THC                           50 ng/ml  Fentanyl                       5 ng/ml      The Normal Value  for all drugs tested is negative. This report includes final unconfirmed screening results to be used for medical treatment purposes only. Unconfirmed results must not be used for non-medical purposes such as employment or legal testing. Clinical consideration should be applied to any drug of abuse test, particularly when unconfirmed results are used.           LACTIC ACID, PLASMA - Abnormal; Notable for the following components:    Lactate 9.0 (*)     All other components within normal limits   BLOOD GAS, ARTERIAL - Abnormal; Notable for the following components:    pH, Arterial 7.097 (*)     pCO2, Arterial 56.8 (*)     pO2, Arterial 300.2 (*)     HCO3, Arterial 17.5 (*)     Base Excess, Arterial -12.4 (*)     O2 Saturation, Arterial 99.8 (*)     CO2 Content 19.3 (*)     All other components within normal limits   POCT GLUCOSE FINGERSTICK - Abnormal; Notable for the following components:    Glucose 336 (*)     All other components within normal limits   RESPIRATORY PANEL PCR W/ COVID-19 (SARS-COV-2), NP SWAB IN UTM/VTP, 2 HR TAT - Normal    Narrative:     In the setting of a positive respiratory panel with a viral infection PLUS a negative procalcitonin without other underlying concern for bacterial infection, consider observing off antibiotics or discontinuation of antibiotics and continue supportive care. If the respiratory panel is positive for atypical bacterial infection (Bordetella pertussis, Chlamydophila pneumoniae, or Mycoplasma pneumoniae), consider antibiotic de-escalation to target atypical bacterial infection.   SINGLE HS TROPONIN T - Normal    Narrative:     High Sensitive Troponin T Reference Range:  <14.0 ng/L- Negative Female for AMI  <22.0 ng/L- Negative Male for AMI  >=14 - Abnormal Female indicating possible myocardial injury.  >=22 - Abnormal Male indicating possible myocardial injury.   Clinicians would have to utilize clinical acumen, EKG, Troponin, and serial changes to determine if it is an  Acute Myocardial Infarction or myocardial injury due to an underlying chronic condition.        ACETAMINOPHEN LEVEL - Normal   SALICYLATE LEVEL - Normal    Narrative:     Therapeutic range for Salicylates:  3.0 - 10.0 mg/dL for antipyretic/analgesic conditions  15.0 - 30.0 mg/dL for anti-inflammatory conditions   BLOOD CULTURE   BLOOD CULTURE   RAINBOW DRAW    Narrative:     The following orders were created for panel order [place] Draw.  Procedure                               Abnormality         Status                     ---------                               -----------         ------                     Green Top (Gel)[[id number]]                                  Final result               Lavender Top[[id number]]                                     Final result               Gold Top - SST[[id number]]                                   Final result               Light Blue Top[[id number]]                                   Final result                 Please view results for these tests on the individual orders.   URINALYSIS, MICROSCOPIC ONLY   BLOOD GAS, ARTERIAL   LACTIC ACID, REFLEX   POCT GLUCOSE FINGERSTICK   POCT GLUCOSE FINGERSTICK   POCT GLUCOSE FINGERSTICK   POCT GLUCOSE FINGERSTICK   POCT GLUCOSE FINGERSTICK   POCT GLUCOSE FINGERSTICK   POCT GLUCOSE FINGERSTICK   POCT GLUCOSE FINGERSTICK   CBC AND DIFFERENTIAL    Narrative:     The following orders were created for panel order CBC & Differential.  Procedure                               Abnormality         Status                     ---------                               -----------         ------                     CBC Auto Differential[[id number]]        Abnormal            Final result                 Please view results for these tests on the individual orders.   GREEN TOP   LAVENDER TOP   GOLD TOP - SST   LIGHT BLUE TOP       EKG:   ECG 12 Lead ED Triage Standing Order; Weak / Dizzy / AMS   Preliminary Result   HEART RATE=93  bpm   RR Sbzfcfdv=735   ms   TX Ixwvdmhi=423  ms   P Horizontal Axis=43  deg   P Front Axis=38  deg   QRSD Nqhygnki=941  ms   QT Yiwfhcgp=097  ms   QDdM=731  ms   QRS Axis=101  deg   T Wave Axis=22  deg   - ABNORMAL ECG -   Sinus rhythm   Right axis deviation   Prolonged QT interval   Date and Time of Study:2024-07-21 07:59:59          Meds given in ED:   Medications   sodium chloride 0.9 % flush 10 mL (has no administration in time range)   propofol (DIPRIVAN) infusion 10 mg/mL 100 mL (5 mcg/kg/min × 68 kg Intravenous New Bag 7/21/24 0838)   dextrose 5 % and lactated Ringer's infusion (100 mL/hr Intravenous New Bag 7/21/24 0829)   Magnesium Standard Dose Replacement - Follow Nurse / BPA Driven Protocol (has no administration in time range)   Phosphorus Replacement - Follow Nurse / BPA Driven Protocol (has no administration in time range)   Calcium Replacement - Follow Nurse / BPA Driven Protocol (has no administration in time range)   chlorhexidine (PERIDEX) 0.12 % solution 15 mL (has no administration in time range)   nitroglycerin (NITROSTAT) SL tablet 0.4 mg (has no administration in time range)   sodium chloride 0.9 % flush 10 mL (has no administration in time range)   sodium chloride 0.9 % infusion 40 mL (has no administration in time range)   sennosides-docusate (PERICOLACE) 8.6-50 MG per tablet 2 tablet (has no administration in time range)     And   polyethylene glycol (MIRALAX) packet 17 g (has no administration in time range)     And   bisacodyl (DULCOLAX) EC tablet 5 mg (has no administration in time range)     And   bisacodyl (DULCOLAX) suppository 10 mg (has no administration in time range)   ondansetron ODT (ZOFRAN-ODT) disintegrating tablet 4 mg (has no administration in time range)     Or   ondansetron (ZOFRAN) injection 4 mg (has no administration in time range)   famotidine (PEPCID) injection 20 mg (has no administration in time range)   Potassium Replacement - Follow Nurse / BPA Driven Protocol (has no administration in  time range)   acetaminophen (TYLENOL) tablet 650 mg (has no administration in time range)     Or   acetaminophen (TYLENOL) suppository 650 mg (has no administration in time range)   fentaNYL citrate (PF) (SUBLIMAZE) injection 75 mcg (has no administration in time range)   cefTRIAXone (ROCEPHIN) 2,000 mg in sodium chloride 0.9 % 100 mL MBP (has no administration in time range)   azithromycin (ZITHROMAX) 500 mg in sodium chloride 0.9 % 250 mL IVPB-VTB (has no administration in time range)   midazolam (VERSED) injection 5 mg (5 mg Intravenous Given 7/21/24 0745)   haloperidol lactate (HALDOL) injection 5 mg (5 mg Intravenous Given 7/21/24 0745)   etomidate (AMIDATE) injection 20.4 mg (20.4 mg Intravenous Given 7/21/24 0809)   rocuronium (ZEMURON) injection 70 mg (70 mg Intravenous Given 7/21/24 0811)   fentaNYL citrate (PF) (SUBLIMAZE) injection 100 mcg (100 mcg Intravenous Given 7/21/24 0830)   lactated ringers bolus 1,000 mL (1,000 mL Intravenous New Bag 7/21/24 0750)   thiamine (B-1) 500 mg in sodium chloride 0.9 % 100 mL IVPB (500 mg Intravenous New Bag 7/21/24 0836)       Imaging results:  No radiology results for the last day    Ambulatory status:   - bedrest    Social issues:        Peripheral Neurovascular  Peripheral Neurovascular (Adult)  Peripheral Neurovascular WDL: WDL    Neuro Cognitive  Neuro Cognitive (Adult)  Cognitive/Neuro/Behavioral WDL: .WDL except, arousability, mood/behavior, motor response, speech, orientation, level of consciousness  Level of Consciousness: Unresponsive  Arousal Level: unresponsive  Orientation: disoriented x 4  Additional Documentation: Barbara Coma Scale (Group)  Barbara Coma Scale  Best Eye Response: 1-->(E1) none  Best Motor Response: 4-->(M4) withdraws from pain  Best Verbal Response: 1-->(V1) none  Barbara Coma Scale Score: 6  Sedation Group  RASS (Mcdonough Agitation-Sedation Scale): -5-->unarousable  Larsen Scale Score: 6 - exhibits no response    Learning  Learning  Assessment (Adult)  Learning Readiness and Ability: cognitive limitation noted  Education Provided  Person Taught: patient    Respiratory  Respiratory WDL  Respiratory WDL: .WDL except  Breath Sounds  Breath Sounds: All Fields  All Lung Fields Breath Sounds: Anterior:, Lateral:, clear, equal bilaterally    Abdominal Pain       Pain Assessments  Pain (Adult)  Preferred Pain Scale: CPOT (Critical-Care Pain Observation Tool)  Patient requested Medication Prescribed for Lower Pain Scale: No  CPOT Facial Expression: 0-->relaxed, neutral  CPOT Body Movements: 0-->absence of movements  CPOT Muscle Tension: 0-->relaxed  Ventilator Compliance/Vocalization: 0-->tolerating ventilator or movement  CPOT Score: 0    NIH Stroke Scale       Lexi Shine RN  07/21/24 09:39 EDT

## 2024-07-21 NOTE — CONSULTS
Neurology Consult Note    Referring Provider: Dr. Browne  Reason for Consultation: seizures    History of present illness:    The patient is unidentified man who was found down outside.   Intubated in ED for airway protection.  UDS positive benzo, cocaine and fentanyl   mg/dL    After arrival to ICU started have involuntary movements.   Routine EEG obtained, not yet resulted.    Just spiked fever to 102.    Past Medical History  unknown    Past Surgical History  Unknown    Family History  unknown    Social History   unknown    Review of Systems   Unable to perform ROS: Mental status change   Intubated and sedated    Medications  Scheduled Meds:azithromycin, 500 mg, Intravenous, Once  [START ON 7/22/2024] cefTRIAXone, 2,000 mg, Intravenous, Q24H  chlorhexidine, 15 mL, Mouth/Throat, Q12H  famotidine, 20 mg, Intravenous, BID  folic acid 1 mg in sodium chloride 0.9 % 50 mL IVPB, 1 mg, Intravenous, Daily  insulin regular, 2-9 Units, Subcutaneous, Q6H  LORazepam, 2 mg, Intravenous, Q6H   Followed by  [START ON 7/22/2024] LORazepam, 1 mg, Intravenous, Q6H  senna-docusate sodium, 2 tablet, Oral, BID  sodium chloride, 1,000 mL, Intravenous, Once  thiamine (B-1) IV, 500 mg, Intravenous, Q8H   Followed by  [START ON 7/24/2024] thiamine (B-1) IV, 200 mg, Intravenous, Q8H   Followed by  [START ON 7/28/2024] thiamine, 100 mg, Oral, Daily      Continuous Infusions:dextrose 5 % and lactated Ringer's, 100 mL/hr, Last Rate: 100 mL/hr (07/21/24 1115)  propofol, 5-50 mcg/kg/min, Last Rate: 50 mcg/kg/min (07/21/24 1352)      PRN Meds:.  acetaminophen **OR** acetaminophen    senna-docusate sodium **AND** polyethylene glycol **AND** bisacodyl **AND** bisacodyl    Calcium Replacement - Follow Nurse / BPA Driven Protocol    dextrose    dextrose    fentaNYL citrate (PF)    glucagon (human recombinant)    LORazepam **OR** LORazepam **OR** LORazepam **OR** LORazepam **OR** LORazepam **OR** LORazepam **OR** LORazepam **OR** LORazepam     Magnesium Standard Dose Replacement - Follow Nurse / BPA Driven Protocol    nitroglycerin    ondansetron ODT **OR** ondansetron    Phosphorus Replacement - Follow Nurse / BPA Driven Protocol    Potassium Replacement - Follow Nurse / BPA Driven Protocol    sodium chloride    sodium chloride    sodium chloride    Vital Signs   Temp:  [95.7 °F (35.4 °C)-98.4 °F (36.9 °C)] 98.4 °F (36.9 °C)  Heart Rate:  [] 105  Resp:  [18-24] 24  BP: ()/() 158/107  FiO2 (%):  [30 %-100 %] 30 %    Examination:  Constitutional: Well-groomed, well-nourished  HENT:  intubated  Eyes: Normal conjunctivae  CVS:  Regular rate and rhythm.   Resp :  mechanical ventilation  Musculoskeletal:  No signs of peripheral edema, neck supple  Skin:  No rash, normal turgor  Neurologic:    Sedated   Pupils reactive   Oculocephalic response present  Plantar responses flexor      Results Review:  Results from last 7 days   Lab Units 07/21/24  0743   WBC 10*3/mm3 16.96*   HEMOGLOBIN g/dL 14.8   HEMATOCRIT % 45.7   PLATELETS 10*3/mm3 306        Results from last 7 days   Lab Units 07/21/24  0743   SODIUM mmol/L 141   POTASSIUM mmol/L 3.4*   CHLORIDE mmol/L 103   CO2 mmol/L 13.7*   BUN mg/dL 8   CREATININE mg/dL 1.09   CALCIUM mg/dL 8.0*   BILIRUBIN mg/dL 0.3   ALK PHOS U/L 96   ALT (SGPT) U/L 45*   AST (SGOT) U/L 46*   GLUCOSE mg/dL 378*      Radiology  Head CT shows no acute pathology   Images reviewed independently    Medical Decision Making and Recommendations  Acute encephalopathy  Likely toxic encephalopathy from polysubstance use  Having fever and possible seizures, will rule out CNS infection with emergent LP  Platelets okay, stat INR sent.     I discussed these findings and my recommendations with nursing staff    Ashley Wilkins MD  07/21/24  15:14 EDT

## 2024-07-21 NOTE — ED PROVIDER NOTES
EMERGENCY DEPARTMENT ENCOUNTER    Room Number:  12/12  PCP: No primary care provider on file.    HPI:  Chief Complaint: Unresponsive  A complete HPI/ROS/PMH/PSH/SH/FH are unobtainable due to: Unresponsive  Context: Shelby Hicks is a 89 y.o. male who presents to the ED c/o acute unresponsiveness.  Patient found down outside of apartment.  Glucose was in the 300s.  No other history is known about this patient.  He received prehospital Narcan without improvement of symptoms.        PAST MEDICAL HISTORY  Active Ambulatory Problems     Diagnosis Date Noted    No Active Ambulatory Problems     Resolved Ambulatory Problems     Diagnosis Date Noted    No Resolved Ambulatory Problems     No Additional Past Medical History         PAST SURGICAL HISTORY  No past surgical history on file.      FAMILY HISTORY  No family history on file.      SOCIAL HISTORY         ALLERGIES  Patient has no allergy information on record.        REVIEW OF SYSTEMS  Review of Systems     Included in HPI  All systems reviewed and negative except for those discussed in HPI.       PHYSICAL EXAM  ED Triage Vitals   Temp Heart Rate Resp BP SpO2   -- 07/21/24 0740 07/21/24 0740 07/21/24 0740 07/21/24 0740    112 18 119/74 94 %      Temp src Heart Rate Source Patient Position BP Location FiO2 (%)   -- 07/21/24 0817 07/21/24 0817 07/21/24 0817 --    Monitor Lying Right arm        Physical Exam      GENERAL: acute distress  HENT: nares patent  EYES: no scleral icterus, pupils 3 mm and minimally reactive to light  CV: regular rhythm, normal rate  RESPIRATORY: normal effort, clear to auscultation bilaterally  ABDOMEN: soft, nontender  MUSCULOSKELETAL: no deformity  NEURO: Sleepy, opens eyes to painful stimulus, moans incomprehensibly, moves all extremities  SKIN: warm, dry, abrasion to left forehead    Vital signs and nursing notes reviewed.          LAB RESULTS  Recent Results (from the past 24 hour(s))   POC Glucose Once    Collection Time: 07/21/24  7:41  AM    Specimen: Blood   Result Value Ref Range    Glucose 336 (H) 70 - 130 mg/dL   Comprehensive Metabolic Panel    Collection Time: 07/21/24  7:43 AM    Specimen: Blood   Result Value Ref Range    Glucose 378 (H) 65 - 99 mg/dL    BUN 8 8 - 23 mg/dL    Creatinine 1.09 0.76 - 1.27 mg/dL    Sodium 141 136 - 145 mmol/L    Potassium 3.4 (L) 3.5 - 5.2 mmol/L    Chloride 103 98 - 107 mmol/L    CO2 13.7 (L) 22.0 - 29.0 mmol/L    Calcium 8.0 (L) 8.2 - 9.6 mg/dL    Total Protein 7.5 6.0 - 8.5 g/dL    Albumin 4.4 3.5 - 5.2 g/dL    ALT (SGPT) 45 (H) 1 - 41 U/L    AST (SGOT) 46 (H) 1 - 40 U/L    Alkaline Phosphatase 96 39 - 117 U/L    Total Bilirubin 0.3 0.0 - 1.2 mg/dL    Globulin 3.1 gm/dL    A/G Ratio 1.4 g/dL    BUN/Creatinine Ratio 7.3 7.0 - 25.0    Anion Gap 24.3 (H) 5.0 - 15.0 mmol/L    eGFR 36.2 (L) >60.0 mL/min/1.73   Single High Sensitivity Troponin T    Collection Time: 07/21/24  7:43 AM    Specimen: Blood   Result Value Ref Range    HS Troponin T 12 <22 ng/L   Magnesium    Collection Time: 07/21/24  7:43 AM    Specimen: Blood   Result Value Ref Range    Magnesium 2.4 (H) 1.7 - 2.3 mg/dL   Green Top (Gel)    Collection Time: 07/21/24  7:43 AM   Result Value Ref Range    Extra Tube Hold for add-ons.    Lavender Top    Collection Time: 07/21/24  7:43 AM   Result Value Ref Range    Extra Tube hold for add-on    Gold Top - SST    Collection Time: 07/21/24  7:43 AM   Result Value Ref Range    Extra Tube Hold for add-ons.    Light Blue Top    Collection Time: 07/21/24  7:43 AM   Result Value Ref Range    Extra Tube Hold for add-ons.    CBC Auto Differential    Collection Time: 07/21/24  7:43 AM    Specimen: Blood   Result Value Ref Range    WBC 16.96 (H) 3.40 - 10.80 10*3/mm3    RBC 4.90 4.14 - 5.80 10*6/mm3    Hemoglobin 14.8 13.0 - 17.7 g/dL    Hematocrit 45.7 37.5 - 51.0 %    MCV 93.3 79.0 - 97.0 fL    MCH 30.2 26.6 - 33.0 pg    MCHC 32.4 31.5 - 35.7 g/dL    RDW 13.1 12.3 - 15.4 %    RDW-SD 44.6 37.0 - 54.0 fl    MPV  10.7 6.0 - 12.0 fL    Platelets 306 140 - 450 10*3/mm3    Neutrophil % 45.0 42.7 - 76.0 %    Lymphocyte % 45.3 19.6 - 45.3 %    Monocyte % 3.4 (L) 5.0 - 12.0 %    Eosinophil % 0.9 0.3 - 6.2 %    Basophil % 0.7 0.0 - 1.5 %    Immature Grans % 4.7 (H) 0.0 - 0.5 %    Neutrophils, Absolute 7.63 (H) 1.70 - 7.00 10*3/mm3    Lymphocytes, Absolute 7.68 (H) 0.70 - 3.10 10*3/mm3    Monocytes, Absolute 0.57 0.10 - 0.90 10*3/mm3    Eosinophils, Absolute 0.16 0.00 - 0.40 10*3/mm3    Basophils, Absolute 0.12 0.00 - 0.20 10*3/mm3    Immature Grans, Absolute 0.80 (H) 0.00 - 0.05 10*3/mm3    nRBC 0.0 0.0 - 0.2 /100 WBC   Ethanol    Collection Time: 07/21/24  7:43 AM    Specimen: Blood   Result Value Ref Range    Ethanol 183 (H) 0 - 10 mg/dL    Ethanol % 0.183 %   ECG 12 Lead ED Triage Standing Order; Weak / Dizzy / AMS    Collection Time: 07/21/24  7:59 AM   Result Value Ref Range    QT Interval 410 ms    QTC Interval 511 ms       Ordered the above labs and reviewed the results.        RADIOLOGY  XR Chest 1 View    Result Date: 7/21/2024  ONE-VIEW PORTABLE CHEST AT 8:27 A.M.  HISTORY: Weakness and dizziness. ET tube placement.  FINDINGS: There is cardiomegaly with considerable upper lobe vascular prominence likely related to an element of congestive heart failure. I cannot completely exclude areas of developing pneumonia and continued follow-up evaluation is recommended. An ET tube ends approximately 3.3 cm above the [place].  This report was finalized on 7/21/2024 8:34 AM by Dr. Mark Wolfe M.D on Workstation: [id number]       Ordered the above noted radiological studies. Reviewed by me in PACS.        MEDICATIONS GIVEN IN ER  Medications   sodium chloride 0.9 % flush 10 mL (has no administration in time range)   propofol (DIPRIVAN) infusion 10 mg/mL 100 mL (has no administration in time range)   thiamine (B-1) 500 mg in sodium chloride 0.9 % 100 mL IVPB (500 mg Intravenous New Bag 7/21/24 0836)   dextrose 5 % and lactated  Ringer's infusion (100 mL/hr Intravenous New Bag 7/21/24 1699)   Potassium Replacement - Follow Nurse / BPA Driven Protocol (has no administration in time range)   Magnesium Standard Dose Replacement - Follow Nurse / BPA Driven Protocol (has no administration in time range)   Phosphorus Replacement - Follow Nurse / BPA Driven Protocol (has no administration in time range)   Calcium Replacement - Follow Nurse / BPA Driven Protocol (has no administration in time range)   chlorhexidine (PERIDEX) 0.12 % solution 15 mL (has no administration in time range)   nitroglycerin (NITROSTAT) SL tablet 0.4 mg (has no administration in time range)   sodium chloride 0.9 % flush 10 mL (has no administration in time range)   sodium chloride 0.9 % infusion 40 mL (has no administration in time range)   sennosides-docusate (PERICOLACE) 8.6-50 MG per tablet 2 tablet (has no administration in time range)     And   polyethylene glycol (MIRALAX) packet 17 g (has no administration in time range)     And   bisacodyl (DULCOLAX) EC tablet 5 mg (has no administration in time range)     And   bisacodyl (DULCOLAX) suppository 10 mg (has no administration in time range)   ondansetron ODT (ZOFRAN-ODT) disintegrating tablet 4 mg (has no administration in time range)     Or   ondansetron (ZOFRAN) injection 4 mg (has no administration in time range)   famotidine (PEPCID) injection 20 mg (has no administration in time range)   Potassium Replacement - Follow Nurse / BPA Driven Protocol (has no administration in time range)   acetaminophen (TYLENOL) tablet 650 mg (has no administration in time range)     Or   acetaminophen (TYLENOL) suppository 650 mg (has no administration in time range)   fentaNYL citrate (PF) (SUBLIMAZE) injection 75 mcg (has no administration in time range)   midazolam (VERSED) injection 5 mg (5 mg Intravenous Given 7/21/24 7545)   haloperidol lactate (HALDOL) injection 5 mg (5 mg Intravenous Given 7/21/24 0745)   etomidate (AMIDATE)  injection 20.4 mg (20.4 mg Intravenous Given 7/21/24 0809)   rocuronium (ZEMURON) injection 70 mg (70 mg Intravenous Given 7/21/24 0811)   fentaNYL citrate (PF) (SUBLIMAZE) injection 100 mcg (100 mcg Intravenous Given 7/21/24 0830)   lactated ringers bolus 1,000 mL (1,000 mL Intravenous New Bag 7/21/24 0750)         ORDERS PLACED DURING THIS VISIT:  Orders Placed This Encounter   Procedures    Blood Culture - Blood,    Blood Culture - Blood,    Respiratory Panel PCR w/COVID-19(SARS-CoV-2) SANJEEV/ANDERS/MARY/PAD/COR/JANN In-House, NP Swab in UTM/VTM, 2 HR TAT - Swab, Nasopharynx    XR Chest 1 View    CT Head Without Contrast    XR Chest 1 View    [place] Draw    Comprehensive Metabolic Panel    Single High Sensitivity Troponin T    Magnesium    Urinalysis With Microscopic If Indicated (No Culture) - Urine, Clean Catch    CBC Auto Differential    Ethanol    Acetaminophen Level    Urine Drug Screen - Urine, Clean Catch    Salicylate Level    Blood Gas, Arterial -Obtain on: Current Settings    Basic Metabolic Panel    CBC Auto Differential    Magnesium    Phosphorus    Lactic Acid, Plasma    NPO Diet NPO Type: Strict NPO    Undress & Gown    Vital Signs    Orthostatic Blood Pressure    Elevate HOB    Oral care    Suction deep laryngeal    Spontaneous Awakening Trial    Nurse to order Arterial Blood Gas PRN for distress    Vital Signs Per hospital policy    Telemetry - Place Orders & Notify Provider of Results When Patient Experiences Acute Chest Pain, Dysrhythmia or Respiratory Distress    Continuous Pulse Oximetry    Height and weight    Daily Weights    Intake and Output    Oral Care - Patient Not on NPPV & Not Intubated    Target Arousal Level RASS 0 to -2    If Patient has BG of < 80 and is symptomatic but not on an IV insulin protocol then use the Adult Hypoglycemia Treatment Orders.    Place Order to Consult Intensivist For Critical Care Management (If Patient Not Admitted to Cardiology for Primary Cardiology Condition)     Notify All Physicians When Patient is Transferred    Use Mobility Guidelines for Advancement of Activity    Saline Lock & Maintain IV Access    Place Sequential Compression Device    Maintain Sequential Compression Device    Code Status and Medical Interventions:    Pulmonology (on-call MD unless specified)    Oxygen Therapy- Nasal Cannula; Titrate 1-6 LPM Per SpO2; 90 - 95%    Ventilator - Vent Mode: AC/VC; FiO2: Titrate Per SpO2; Titrate Oxygen for SpO2: 90 - 95%    Weaning Parameters    Spontaneous Breathing Trial    POC Glucose Once    POC Glucose Once    POC Glucose Once    POC Glucose Q4H    ECG 12 Lead ED Triage Standing Order; Weak / Dizzy / AMS    Insert Peripheral IV    Insert Peripheral IV    Inpatient Admission    Inpatient Admission    Fall Precautions    CBC & Differential    Green Top (Gel)    Lavender Top    Gold Top - SST    Light Blue Top         OUTPATIENT MEDICATION MANAGEMENT:  Current Facility-Administered Medications Ordered in Epic   Medication Dose Route Frequency Provider Last Rate Last Admin    acetaminophen (TYLENOL) tablet 650 mg  650 mg Oral Q4H PRN Frank Browne MD        Or    acetaminophen (TYLENOL) suppository 650 mg  650 mg Rectal Q4H PRN Frank Browne MD        sennosides-docusate (PERICOLACE) 8.6-50 MG per tablet 2 tablet  2 tablet Oral BID Frank Browne MD        And    polyethylene glycol (MIRALAX) packet 17 g  17 g Oral Daily PRN Frank Browne MD        And    bisacodyl (DULCOLAX) EC tablet 5 mg  5 mg Oral Daily PRN Frank Browne MD        And    bisacodyl (DULCOLAX) suppository 10 mg  10 mg Rectal Daily PRN Frank Browne MD        Calcium Replacement - Follow Nurse / BPA Driven Protocol   Does not apply PRN Ruy Cuadra II, MD        chlorhexidine (PERIDEX) 0.12 % solution 15 mL  15 mL Mouth/Throat Q12H Frank Browne MD        dextrose 5 % and lactated Ringer's infusion  100 mL/hr Intravenous Continuous Ruy Cuadra II,  mL/hr at  07/21/24 0829 100 mL/hr at 07/21/24 0829    famotidine (PEPCID) injection 20 mg  20 mg Intravenous BID Frank Browne MD        fentaNYL citrate (PF) (SUBLIMAZE) injection 75 mcg  75 mcg Intravenous Q2H PRN Frank Browne MD        Magnesium Standard Dose Replacement - Follow Nurse / BPA Driven Protocol   Does not apply PRN Ruy Cuadra II, MD        nitroglycerin (NITROSTAT) SL tablet 0.4 mg  0.4 mg Sublingual Q5 Min PRN Frank Browne MD        ondansetron ODT (ZOFRAN-ODT) disintegrating tablet 4 mg  4 mg Oral Q6H PRN Frank Browne MD        Or    ondansetron (ZOFRAN) injection 4 mg  4 mg Intravenous Q6H PRN Frank Browne MD        Phosphorus Replacement - Follow Nurse / BPA Driven Protocol   Does not apply PRN Ruy Cuadra II, MD        Potassium Replacement - Follow Nurse / BPA Driven Protocol   Does not apply PRN Ruy Cuadra II, MD        Potassium Replacement - Follow Nurse / BPA Driven Protocol   Does not apply PRN Frank Browne MD        propofol (DIPRIVAN) infusion 10 mg/mL 100 mL  5-50 mcg/kg/min Intravenous Titrated Ruy Cuadra II, MD        sodium chloride 0.9 % flush 10 mL  10 mL Intravenous PRN Emergency, Triage Protocol, MD        sodium chloride 0.9 % flush 10 mL  10 mL Intravenous PRN Frank Browne MD        sodium chloride 0.9 % infusion 40 mL  40 mL Intravenous PRN Frank Browne MD        thiamine (B-1) 500 mg in sodium chloride 0.9 % 100 mL IVPB  500 mg Intravenous Once Ruy Cuadra II,  mL/hr at 07/21/24 0836 500 mg at 07/21/24 0836     No current [facility]-ordered outpatient medications on file.       PROCEDURES  Critical Care    Performed by: Ruy Cuadra II, MD  Authorized by: Ruy Cuadra II, MD    Critical care provider statement:     Critical care time (minutes):  40    Critical care was necessary to treat or prevent imminent or life-threatening deterioration of the following conditions:  CNS failure or  compromise    Critical care was time spent personally by me on the following activities:  Ordering and performing treatments and interventions, ordering and review of laboratory studies, ordering and review of radiographic studies, pulse oximetry, re-evaluation of patient's condition, review of old charts, discussions with consultants, evaluation of patient's response to treatment, examination of patient and obtaining history from patient or surrogate  Intubation    Date/Time: 7/21/2024 8:38 AM    Performed by: Ruy Cuadra II, MD  Authorized by: Ruy Cuadra II, MD    Consent:     Consent obtained:  Emergent situation  Procedure details:     Preoxygenation:  Nonrebreather mask    CPR in progress: no      Number of attempts:  1  Successful intubation attempt details:     Intubation method:  Oral    Intubation technique: video assisted      Laryngoscope blade:  Hypercurved    Bougie used: no      Grade view: I      Tube size (mm):  7.5    Tube type:  Cuffed    Tube visualized through cords: yes    Placement assessment:     ETT at teeth/gumline (cm):  21    Tube secured with:  ETT monae    Breath sounds:  Equal    Placement verification: chest rise, colorimetric ETCO2 and CXR verification      CXR findings:  Appropriate position  Post-procedure details:     Procedure completion:  Tolerated well, no immediate complications  Comments:      Intubation performed by VIN[employer] Pop student. I was personally present throughout the procedure.            MEDICAL DECISION MAKING, PROGRESS, and CONSULTS    Discussion below represents my analysis of pertinent findings related to patient's condition, differential diagnosis, treatment plan and final disposition.      Additional sources:  - Discussed/obtained information from independent historians: EMS  Additional information was obtained to confirm the patient's history.        Differential diagnosis:    Differential diagnosis for altered mental status  includes:  - vital sign abnormalities such as HTN encephalopathy, hypotension, hypoxemia, hypercarbia, heat stroke  - toxic/metabolic pathology such as hypoglycemia, DKA, hypo/hyper-natremia, thyroid storm, myxedema coma, medication side effect (either intentional or accidental)  - infectious etiology  - intracranial pathology such as stroke, seizure, intracranial mass, intracranial hemorrhage  - psychiatric pathology             Independent interpretation of labs, radiology studies, and discussions with consultants:  ED Course as of 07/21/24 0950   Sun Jul 21, 2024   0800 WBC(!): 16.96 [TD]   0817 Ethanol(!): 183 [TD]   0820 EKG independently interpreted by myself.  Time 7:59 AM.  Sinus rhythm.  Heart rate 93.  Right axis deviation.  Long QTc interval. [TD]   0820 Calcium(!): 8.0 [TD]   0821 Potassium(!): 3.4 [TD]   0821 CO2(!): 13.7 [TD]   0821 Anion Gap(!): 24.3 [TD]   0833 I discussed the case with Dr. Browne, ICU.  He will admit.  Head CT is pending. [TD]   0950 Lactate(!!): 9.0 [TD]   0950 Fentanyl, Urine(!): Positive [TD]   0950 Cocaine Screen, Urine(!): Positive [TD]   0950 Benzodiazepine Screen, Urine(!): Positive [TD]      ED Course User Index  [TD] Ruy Cuadra II, MD           DIAGNOSIS  Final diagnoses:   Altered mental status, unspecified altered mental status type   Alcoholic intoxication with complication   High anion gap metabolic acidosis         DISPOSITION  Admit      Latest Documented Vital Signs:  As of 08:37 EDT  BP- 100/57 HR- 100 Temp-   O2 sat- 98%      --    Please note that portions of this were completed with a voice recognition program.       Note Disclaimer: At [facility], we believe that sharing information builds trust and better relationships. You are receiving this note because you are receiving care at [facility] or recently visited. It is possible you will see health information before a provider has talked with you about it. This kind of information can be easy to  misunderstand. To help you fully understand what it means for your health, we urge you to discuss this note with your provider.         Ruy Cuadra II, MD  07/21/24 0999

## 2024-07-21 NOTE — PROGRESS NOTES
Neurology Follow up   CSF appears benign, no evidence for CNS infection.     Ashley Wilkins M.D.  7/21/24  7:30 pm

## 2024-07-21 NOTE — H&P
Group: [facility]         CRITICAL CARE NOTE    Patient Identification:  Shelby Hicks  89 y.o.  male  1/1/1841  [id number]                CC: Found unresponsive    History of Present Illness:   Ming Hicks patient, no other information available at this time.  Discussed the case with Dr. Ruy Cuadra from the emergency room.  Patient was found unconscious in the scene.  Brought here for altered mental status.  Had to be intubated for airway protection here by Dr. Cuadra.  No other information is available.  No friends or family accompanying the patient.  Blood work shows metabolic acidosis, chest x-ray confirms aspiration pneumonia, CT head is negative, urine drug screen positive for multiple illicit substances and blood alcohol level is elevated.        Review of Systems   Unable to perform ROS: Intubated       Past Medical History:  No past medical history on file.    Past Surgical History:  No past surgical history on file.     Home Meds:  (Not in a hospital admission)      Allergies:  Not on File    Social History:    Unobtainable, no family present and patient is intubated    Family History:  No family history on file.    Physical Exam:  /57 (BP Location: Right arm, Patient Position: Lying)   Pulse 100   Resp 18   Wt 68 kg (149 lb 14.4 oz)   SpO2 98%  There is no height or weight on file to calculate BMI. 98% 68 kg (149 lb 14.4 oz)  Physical Exam  Constitutional:       Appearance: He is well-developed. He is ill-appearing.   HENT:      Right Ear: External ear normal.      Left Ear: External ear normal.      Nose: Nose normal.      Mouth/Throat:      Comments: Oral exam shows endotracheal tube in good position  Eyes:      General: No scleral icterus.     Conjunctiva/sclera: Conjunctivae normal.      Comments: Pupils are equal, pinpoint bilaterally   Neck:      Thyroid: No thyromegaly.      Vascular: No JVD.   Cardiovascular:      Rate and Rhythm: Normal rate and regular rhythm.      " Heart sounds: No murmur heard.     Comments: No edema  Pulmonary:      Effort: Pulmonary effort is normal.      Breath sounds: No wheezing or rales.   Abdominal:      General: There is no distension.      Palpations: Abdomen is soft.      Comments: No liver or spleen enlargment palpable   Musculoskeletal:         General: No deformity.      Cervical back: Neck supple. No rigidity.      Comments: No deformity in all 4 extrem   Skin:     Findings: No erythema or rash.      Comments: No palpable nodules   Neurological:      Comments: Intubated, mechanically ventilated, unresponsive   Psychiatric:      Comments: Intubated, unresponsive         LABS:  No results found for: \"COVID19\"    Lab Results   Component Value Date    CALCIUM 8.0 (L) 07/21/2024     Results from last 7 days   Lab Units 07/21/24  0743   MAGNESIUM mg/dL 2.4*   SODIUM mmol/L 141   POTASSIUM mmol/L 3.4*   CHLORIDE mmol/L 103   CO2 mmol/L 13.7*   BUN mg/dL 8   CREATININE mg/dL 1.09   GLUCOSE mg/dL 378*   CALCIUM mg/dL 8.0*   WBC 10*3/mm3 16.96*   HEMOGLOBIN g/dL 14.8   PLATELETS 10*3/mm3 306   ALT (SGPT) U/L 45*   AST (SGOT) U/L 46*     Lab Results   Component Value Date    TROPONINT 12 07/21/2024     Results from last 7 days   Lab Units 07/21/24  0743   HSTROP T ng/L 12             Imaging: I personally visualized the images of chest x-ray showing right upper lobe infiltrate, likely aspiration pneumonia.  Endotracheal tube in good position    I reviewed images of CT head.  To my personal impression there are no intracranial abnormalities.  Official radiology report still pending.    I reviewed a twelve-lead EKG showing sinus rhythm with prolonged QT but no ST changes suggesting any ischemia      Assessment:  Acute toxic encephalopathy  Altered mental status with inability to protect airway  Need for mechanical ventilation, cannot protect airway  Illicit drug use  Aspiration pneumonia  Hyperglycemia  Elevated liver enzymes  Metabolic acidosis  Alcohol " intoxication  Prolonged QT interval  Hyperglycemia        Recommendations:  Admit to ICU.  Await for family to contact hospital.  Currently Ming Hicks, no identification.  Has illicit substances in his urine drug screen and positive blood alcohol level.  We will keep him sedated with propofol and fentanyl.  Continue adjusting mechanical ventilator for airway protection.  CT of the head obtained, official radiology dictation still pending.  To my initial interpretation CT head looks normal.  Start Rocephin intravenously for aspiration pneumonia.  Check procalcitonin and sputum culture  Monitor QT interval in the ICU with repeat EKG.  Use Ativan IV for any agitation since patient may withdraw from alcohol and other substances.  Checking hemoglobin A1c.  Start insulin sliding scale to treat hyperglycemia.  Start compression devices to the legs for DVT prophylaxis  Start Pepcid IV for stress ulcer prophylaxis    Total critical care time 40 minutes excluding any separately billable procedures times    Frank Browne MD  7/21/2024  08:36 EDT      Much of this encounter note is an electronic transcription/translation of spoken language to printed text using Dragon Software.

## 2024-07-21 NOTE — PLAN OF CARE
Goal Outcome Evaluation:  Plan of Care Reviewed With: patient        Progress: no change     Patient admitted after being found unresponsive. Positive for cocaine, benzos, and fentanyl, ETOH 183. Upon arrival to unit, patient found to be posturing. Dr. Browne made aware. Orders for Keppra, EEG, and neuro consult received. Patient spiked a temp. Dr. Wilkins at bedside and performed a LP. Tylenol suppository given.

## 2024-07-21 NOTE — Clinical Note
Level of Care: Critical Care [6]   Diagnosis: Altered mental state [085615]   Certification: I Certify That Inpatient Hospital Services Are Medically Necessary For Greater Than 2 Midnights

## 2024-07-22 ENCOUNTER — APPOINTMENT (OUTPATIENT)
Dept: GENERAL RADIOLOGY | Facility: HOSPITAL | Age: 34
End: 2024-07-22

## 2024-07-22 LAB
ANION GAP SERPL CALCULATED.3IONS-SCNC: 14.4 MMOL/L (ref 5–15)
ARTERIAL PATENCY WRIST A: POSITIVE
ATMOSPHERIC PRESS: 748.7 MMHG
BASE EXCESS BLDA CALC-SCNC: 1.3 MMOL/L (ref 0–2)
BASOPHILS # BLD AUTO: 0.06 10*3/MM3 (ref 0–0.2)
BASOPHILS NFR BLD AUTO: 0.3 % (ref 0–1.5)
BDY SITE: ABNORMAL
BUN SERPL-MCNC: 7 MG/DL (ref 6–20)
BUN/CREAT SERPL: 10.8 (ref 7–25)
CALCIUM SPEC-SCNC: 7.6 MG/DL (ref 8.6–10.5)
CHLORIDE SERPL-SCNC: 102 MMOL/L (ref 98–107)
CO2 BLDA-SCNC: 26.5 MMOL/L (ref 23–27)
CO2 SERPL-SCNC: 17.6 MMOL/L (ref 22–29)
CREAT SERPL-MCNC: 0.65 MG/DL (ref 0.76–1.27)
D-LACTATE SERPL-SCNC: 2.3 MMOL/L (ref 0.5–2)
D-LACTATE SERPL-SCNC: 3.8 MMOL/L (ref 0.5–2)
DEPRECATED RDW RBC AUTO: 43.9 FL (ref 37–54)
EGFRCR SERPLBLD CKD-EPI 2021: 127.6 ML/MIN/1.73
EOSINOPHIL # BLD AUTO: 0.05 10*3/MM3 (ref 0–0.4)
EOSINOPHIL NFR BLD AUTO: 0.2 % (ref 0.3–6.2)
ERYTHROCYTE [DISTWIDTH] IN BLOOD BY AUTOMATED COUNT: 13.1 % (ref 12.3–15.4)
GLUCOSE BLDC GLUCOMTR-MCNC: 102 MG/DL (ref 70–130)
GLUCOSE BLDC GLUCOMTR-MCNC: 110 MG/DL (ref 70–130)
GLUCOSE BLDC GLUCOMTR-MCNC: 111 MG/DL (ref 70–130)
GLUCOSE BLDC GLUCOMTR-MCNC: 115 MG/DL (ref 70–130)
GLUCOSE BLDC GLUCOMTR-MCNC: 117 MG/DL (ref 70–130)
GLUCOSE BLDC GLUCOMTR-MCNC: 124 MG/DL (ref 70–130)
GLUCOSE BLDC GLUCOMTR-MCNC: 137 MG/DL (ref 70–130)
GLUCOSE SERPL-MCNC: 263 MG/DL (ref 65–99)
HCO3 BLDA-SCNC: 25.4 MMOL/L (ref 22–28)
HCT VFR BLD AUTO: 39.5 % (ref 37.5–51)
HEMODILUTION: NO
HGB BLD-MCNC: 13.4 G/DL (ref 13–17.7)
IMM GRANULOCYTES # BLD AUTO: 0.12 10*3/MM3 (ref 0–0.05)
IMM GRANULOCYTES NFR BLD AUTO: 0.6 % (ref 0–0.5)
INHALED O2 CONCENTRATION: 30 %
LYMPHOCYTES # BLD AUTO: 3.37 10*3/MM3 (ref 0.7–3.1)
LYMPHOCYTES NFR BLD AUTO: 15.5 % (ref 19.6–45.3)
MAGNESIUM SERPL-MCNC: 1.5 MG/DL (ref 1.6–2.6)
MCH RBC QN AUTO: 30.7 PG (ref 26.6–33)
MCHC RBC AUTO-ENTMCNC: 33.9 G/DL (ref 31.5–35.7)
MCV RBC AUTO: 90.6 FL (ref 79–97)
MODALITY: ABNORMAL
MONOCYTES # BLD AUTO: 1.35 10*3/MM3 (ref 0.1–0.9)
MONOCYTES NFR BLD AUTO: 6.2 % (ref 5–12)
NEUTROPHILS NFR BLD AUTO: 16.75 10*3/MM3 (ref 1.7–7)
NEUTROPHILS NFR BLD AUTO: 77.2 % (ref 42.7–76)
NRBC BLD AUTO-RTO: 0 /100 WBC (ref 0–0.2)
O2 A-A PPRESDIFF RESPIRATORY: 0.4 MMHG
PCO2 BLDA: 37.6 MM HG (ref 35–45)
PEEP RESPIRATORY: 5 CM[H2O]
PH BLDA: 7.44 PH UNITS (ref 7.35–7.45)
PHOSPHATE SERPL-MCNC: 1.6 MG/DL (ref 2.5–4.5)
PHOSPHATE SERPL-MCNC: 2 MG/DL (ref 2.5–4.5)
PLATELET # BLD AUTO: 223 10*3/MM3 (ref 140–450)
PMV BLD AUTO: 10.7 FL (ref 6–12)
PO2 BLD: 242 MM[HG] (ref 0–500)
PO2 BLDA: 72.6 MM HG (ref 80–100)
POTASSIUM SERPL-SCNC: 3.4 MMOL/L (ref 3.5–5.2)
POTASSIUM SERPL-SCNC: 3.6 MMOL/L (ref 3.5–5.2)
PROCALCITONIN SERPL-MCNC: 0.69 NG/ML (ref 0–0.25)
PSV: 6 CMH2O
QT INTERVAL: 338 MS
QTC INTERVAL: 451 MS
RBC # BLD AUTO: 4.36 10*6/MM3 (ref 4.14–5.8)
SAO2 % BLDCOA: 95 % (ref 92–98.5)
SODIUM SERPL-SCNC: 134 MMOL/L (ref 136–145)
TOTAL RATE: 26 BREATHS/MINUTE
VENTILATOR MODE: ABNORMAL
WBC NRBC COR # BLD AUTO: 21.7 10*3/MM3 (ref 3.4–10.8)

## 2024-07-22 PROCEDURE — 83605 ASSAY OF LACTIC ACID: CPT | Performed by: EMERGENCY MEDICINE

## 2024-07-22 PROCEDURE — 93005 ELECTROCARDIOGRAM TRACING: CPT | Performed by: INTERNAL MEDICINE

## 2024-07-22 PROCEDURE — 25010000002 PROPOFOL 10 MG/ML EMULSION: Performed by: EMERGENCY MEDICINE

## 2024-07-22 PROCEDURE — 94761 N-INVAS EAR/PLS OXIMETRY MLT: CPT

## 2024-07-22 PROCEDURE — 93010 ELECTROCARDIOGRAM REPORT: CPT | Performed by: INTERNAL MEDICINE

## 2024-07-22 PROCEDURE — 84145 PROCALCITONIN (PCT): CPT | Performed by: INTERNAL MEDICINE

## 2024-07-22 PROCEDURE — 25010000002 THIAMINE HCL 200 MG/2ML SOLUTION 2 ML VIAL: Performed by: INTERNAL MEDICINE

## 2024-07-22 PROCEDURE — 71045 X-RAY EXAM CHEST 1 VIEW: CPT

## 2024-07-22 PROCEDURE — 85025 COMPLETE CBC W/AUTO DIFF WBC: CPT | Performed by: INTERNAL MEDICINE

## 2024-07-22 PROCEDURE — 82948 REAGENT STRIP/BLOOD GLUCOSE: CPT

## 2024-07-22 PROCEDURE — 87147 CULTURE TYPE IMMUNOLOGIC: CPT | Performed by: INTERNAL MEDICINE

## 2024-07-22 PROCEDURE — 25010000002 LORAZEPAM PER 2 MG: Performed by: INTERNAL MEDICINE

## 2024-07-22 PROCEDURE — 25810000003 SODIUM CHLORIDE 0.9 % SOLUTION: Performed by: INTERNAL MEDICINE

## 2024-07-22 PROCEDURE — 25010000002 FENTANYL CITRATE (PF) 50 MCG/ML SOLUTION: Performed by: INTERNAL MEDICINE

## 2024-07-22 PROCEDURE — 94760 N-INVAS EAR/PLS OXIMETRY 1: CPT

## 2024-07-22 PROCEDURE — 99233 SBSQ HOSP IP/OBS HIGH 50: CPT | Performed by: PSYCHIATRY & NEUROLOGY

## 2024-07-22 PROCEDURE — 94003 VENT MGMT INPAT SUBQ DAY: CPT

## 2024-07-22 PROCEDURE — 25810000003 DEXTROSE 5% IN LACTATED RINGERS PER 1000 ML: Performed by: EMERGENCY MEDICINE

## 2024-07-22 PROCEDURE — 94799 UNLISTED PULMONARY SVC/PX: CPT

## 2024-07-22 PROCEDURE — 82803 BLOOD GASES ANY COMBINATION: CPT | Performed by: INTERNAL MEDICINE

## 2024-07-22 PROCEDURE — 25010000002 MAGNESIUM SULFATE 2 GM/50ML SOLUTION: Performed by: INTERNAL MEDICINE

## 2024-07-22 PROCEDURE — 87205 SMEAR GRAM STAIN: CPT | Performed by: INTERNAL MEDICINE

## 2024-07-22 PROCEDURE — 87070 CULTURE OTHR SPECIMN AEROBIC: CPT | Performed by: INTERNAL MEDICINE

## 2024-07-22 PROCEDURE — 87186 SC STD MICRODIL/AGAR DIL: CPT | Performed by: INTERNAL MEDICINE

## 2024-07-22 PROCEDURE — 84132 ASSAY OF SERUM POTASSIUM: CPT | Performed by: INTERNAL MEDICINE

## 2024-07-22 PROCEDURE — 84100 ASSAY OF PHOSPHORUS: CPT | Performed by: INTERNAL MEDICINE

## 2024-07-22 PROCEDURE — 80048 BASIC METABOLIC PNL TOTAL CA: CPT | Performed by: INTERNAL MEDICINE

## 2024-07-22 PROCEDURE — 25010000002 POTASSIUM CHLORIDE 10 MEQ/100ML SOLUTION: Performed by: INTERNAL MEDICINE

## 2024-07-22 PROCEDURE — 36600 WITHDRAWAL OF ARTERIAL BLOOD: CPT | Performed by: INTERNAL MEDICINE

## 2024-07-22 PROCEDURE — 83735 ASSAY OF MAGNESIUM: CPT | Performed by: INTERNAL MEDICINE

## 2024-07-22 RX ORDER — FENTANYL/ROPIVACAINE/NS/PF 2-625MCG/1
15 PLASTIC BAG, INJECTION (ML) EPIDURAL
Status: COMPLETED | OUTPATIENT
Start: 2024-07-22 | End: 2024-07-23

## 2024-07-22 RX ORDER — DEXMEDETOMIDINE HYDROCHLORIDE 4 UG/ML
.2-1.5 INJECTION, SOLUTION INTRAVENOUS
Status: DISCONTINUED | OUTPATIENT
Start: 2024-07-22 | End: 2024-07-22

## 2024-07-22 RX ORDER — POTASSIUM CHLORIDE 750 MG/1
40 TABLET, FILM COATED, EXTENDED RELEASE ORAL EVERY 4 HOURS
Status: COMPLETED | OUTPATIENT
Start: 2024-07-22 | End: 2024-07-22

## 2024-07-22 RX ORDER — MAGNESIUM SULFATE HEPTAHYDRATE 40 MG/ML
2 INJECTION, SOLUTION INTRAVENOUS
Status: COMPLETED | OUTPATIENT
Start: 2024-07-22 | End: 2024-07-22

## 2024-07-22 RX ORDER — FENTANYL/ROPIVACAINE/NS/PF 2-625MCG/1
15 PLASTIC BAG, INJECTION (ML) EPIDURAL ONCE
Status: COMPLETED | OUTPATIENT
Start: 2024-07-22 | End: 2024-07-22

## 2024-07-22 RX ORDER — POTASSIUM CHLORIDE 7.45 MG/ML
10 INJECTION INTRAVENOUS
Status: COMPLETED | OUTPATIENT
Start: 2024-07-22 | End: 2024-07-22

## 2024-07-22 RX ADMIN — PROPOFOL INJECTABLE EMULSION 50 MCG/KG/MIN: 10 INJECTION, EMULSION INTRAVENOUS at 06:31

## 2024-07-22 RX ADMIN — MAGNESIUM SULFATE HEPTAHYDRATE 2 G: 40 INJECTION, SOLUTION INTRAVENOUS at 10:38

## 2024-07-22 RX ADMIN — ACETAMINOPHEN 325MG 650 MG: 325 TABLET ORAL at 15:15

## 2024-07-22 RX ADMIN — POTASSIUM CHLORIDE 40 MEQ: 750 TABLET, EXTENDED RELEASE ORAL at 23:26

## 2024-07-22 RX ADMIN — LORAZEPAM 2 MG: 2 INJECTION INTRAMUSCULAR; INTRAVENOUS at 02:36

## 2024-07-22 RX ADMIN — POTASSIUM CHLORIDE 40 MEQ: 750 TABLET, EXTENDED RELEASE ORAL at 19:09

## 2024-07-22 RX ADMIN — POTASSIUM CHLORIDE 10 MEQ: 7.46 INJECTION, SOLUTION INTRAVENOUS at 08:06

## 2024-07-22 RX ADMIN — PROPOFOL INJECTABLE EMULSION 30 MCG/KG/MIN: 10 INJECTION, EMULSION INTRAVENOUS at 11:44

## 2024-07-22 RX ADMIN — FENTANYL CITRATE 75 MCG: 50 INJECTION, SOLUTION INTRAMUSCULAR; INTRAVENOUS at 08:29

## 2024-07-22 RX ADMIN — POTASSIUM PHOSPHATE, MONOBASIC POTASSIUM PHOSPHATE, DIBASIC 15 MMOL: 224; 236 INJECTION, SOLUTION, CONCENTRATE INTRAVENOUS at 20:00

## 2024-07-22 RX ADMIN — FENTANYL CITRATE 75 MCG: 50 INJECTION, SOLUTION INTRAMUSCULAR; INTRAVENOUS at 02:22

## 2024-07-22 RX ADMIN — FAMOTIDINE 20 MG: 10 INJECTION INTRAVENOUS at 08:29

## 2024-07-22 RX ADMIN — SODIUM CHLORIDE, SODIUM LACTATE, POTASSIUM CHLORIDE, CALCIUM CHLORIDE AND DEXTROSE MONOHYDRATE 100 ML/HR: 5; 600; 310; 30; 20 INJECTION, SOLUTION INTRAVENOUS at 08:40

## 2024-07-22 RX ADMIN — THIAMINE HYDROCHLORIDE 500 MG: 100 INJECTION, SOLUTION INTRAMUSCULAR; INTRAVENOUS at 21:31

## 2024-07-22 RX ADMIN — THIAMINE HYDROCHLORIDE 500 MG: 100 INJECTION, SOLUTION INTRAMUSCULAR; INTRAVENOUS at 06:19

## 2024-07-22 RX ADMIN — MAGNESIUM SULFATE HEPTAHYDRATE 2 G: 40 INJECTION, SOLUTION INTRAVENOUS at 12:48

## 2024-07-22 RX ADMIN — POTASSIUM PHOSPHATE, MONOBASIC POTASSIUM PHOSPHATE, DIBASIC 15 MMOL: 224; 236 INJECTION, SOLUTION, CONCENTRATE INTRAVENOUS at 08:43

## 2024-07-22 RX ADMIN — 0.12% CHLORHEXIDINE GLUCONATE 15 ML: 1.2 RINSE ORAL at 08:29

## 2024-07-22 RX ADMIN — POTASSIUM CHLORIDE 10 MEQ: 7.46 INJECTION, SOLUTION INTRAVENOUS at 11:46

## 2024-07-22 RX ADMIN — LORAZEPAM 1 MG: 2 INJECTION INTRAMUSCULAR; INTRAVENOUS at 11:32

## 2024-07-22 RX ADMIN — LORAZEPAM 2 MG: 2 INJECTION INTRAMUSCULAR; INTRAVENOUS at 04:28

## 2024-07-22 RX ADMIN — FAMOTIDINE 20 MG: 10 INJECTION INTRAVENOUS at 21:31

## 2024-07-22 RX ADMIN — POTASSIUM CHLORIDE 10 MEQ: 7.46 INJECTION, SOLUTION INTRAVENOUS at 09:29

## 2024-07-22 RX ADMIN — FOLIC ACID 1 MG: 5 INJECTION, SOLUTION INTRAMUSCULAR; INTRAVENOUS; SUBCUTANEOUS at 12:48

## 2024-07-22 RX ADMIN — DEXMEDETOMIDINE HYDROCHLORIDE 0.2 MCG/KG/HR: 4 INJECTION, SOLUTION INTRAVENOUS at 10:31

## 2024-07-22 RX ADMIN — POTASSIUM CHLORIDE 10 MEQ: 7.46 INJECTION, SOLUTION INTRAVENOUS at 10:37

## 2024-07-22 RX ADMIN — PROPOFOL INJECTABLE EMULSION 50 MCG/KG/MIN: 10 INJECTION, EMULSION INTRAVENOUS at 01:12

## 2024-07-22 RX ADMIN — THIAMINE HYDROCHLORIDE 500 MG: 100 INJECTION, SOLUTION INTRAMUSCULAR; INTRAVENOUS at 14:47

## 2024-07-22 RX ADMIN — POTASSIUM PHOSPHATE, MONOBASIC POTASSIUM PHOSPHATE, DIBASIC 15 MMOL: 224; 236 INJECTION, SOLUTION, CONCENTRATE INTRAVENOUS at 22:41

## 2024-07-22 RX ADMIN — MAGNESIUM SULFATE HEPTAHYDRATE 2 G: 40 INJECTION, SOLUTION INTRAVENOUS at 08:29

## 2024-07-22 NOTE — PROGRESS NOTES
LOS: 1 day   Patient Care Team:  Provider, No Known as PCP - General    Subjective     Patient new to me today picking up pulmonary critical coverage from Dr. Browne I discussed the case with him yesterday he was found down apparently in the hallway of an apartment complex he was febrile he had some unusual movements question seizure.  His drug screens were positive for cocaine, fentanyl and benzodiazepines and his blood alcohol level is 183  Patient is sedated with propofol he does arouse that he is very agitated when he is awake  Review of Systems:          Objective     Vital Signs  Vital Sign Min/Max for last 24 hours  Temp  Min: 95.7 °F (35.4 °C)  Max: 102.6 °F (39.2 °C)   BP  Min: 88/47  Max: 166/111   Pulse  Min: 77  Max: 121   Resp  Min: 18  Max: 24   SpO2  Min: 92 %  Max: 100 %   Flow (L/min)  Min: 15  Max: 15   Weight  Min: 68 kg (149 lb 14.4 oz)  Max: 70.6 kg (155 lb 10.3 oz)        Ventilator/Non-Invasive Ventilation Settings (From admission, onward)       Start     Ordered    07/21/24 1215  Ventilator - Vent Mode: AC/VC; Rate: Other; Rate: 24; FiO2: Titrate Per SpO2; Titrate Oxygen for SpO2: 90 - 95%; PEEP: 5; Tidal Volume: mL; TV: 550  Continuous        Question Answer Comment   Vent Mode AC/VC    Rate Other    Rate 24    FiO2 Titrate Per SpO2    Titrate Oxygen for SpO2 90 - 95%    PEEP 5    Tidal Volume mL            07/21/24 1215    07/21/24 0830  Ventilator - Vent Mode: AC/VC; FiO2: Titrate Per SpO2; Titrate Oxygen for SpO2: 90 - 95%  Continuous,   Status:  Canceled        Question Answer Comment   Vent Mode AC/VC    FiO2 Titrate Per SpO2    Titrate Oxygen for SpO2 90 - 95%        07/21/24 0834                                 Body mass index is 25.93 kg/m².  I/O last 3 completed shifts:  In: 2585 [I.V.:335; IV Piggyback:2250]  Out: 3300 [Urine:3300]  I/O this shift:  In: 3178 [I.V.:3078; IV Piggyback:100]  Out: 1225 [Urine:1225]        Physical Exam:  General Appearance: We have a  well-developed  male orally intubated on a ventilator he is on spontaneous breathing trial currently with just a pressure 4 7 PEEP of 5 FiO2 30% he is pulling tidal volumes in excess of 450 cc his respiratory rate is only about 18 his SpO2 is in the upper 90s  Eyes: Conjunctiva clear and anicteric pupils are equal and reactive to light they are about 2-1/2 mm  ENT: Mucous membranes are dry oral endotracheal tube  Neck: No adenopathy or thyromegaly no jugular venous tension trachea midline  Lungs: He has got some coarse rhonchi cleared with suctioning thick yellow mucus but he is coughing it out vigorously he had a whole ventilator circuit tubing full of secretions he coughed out.  Cardiac: Regular rate rhythm no murmur  Abdomen: Soft no palpable hepatosplenomegaly or masses  : Not examined  Musculoskeletal: Grossly normal  Skin: Warm and dry no jaundice no petechiae  Neuro: He is not really following commands in English or Estonian he is definitely moving all extremities vigorously when awake pulling at restraints  Extremities/P Vascular: No clubbing no cyanosis no edema palpable radial and dorsalis pedis pulses  MSE: Hard to tell       Labs:  Results from last 7 days   Lab Units 07/21/24  0743   GLUCOSE mg/dL 378*   SODIUM mmol/L 141   POTASSIUM mmol/L 3.4*   MAGNESIUM mg/dL 2.4*   CO2 mmol/L 13.7*   CHLORIDE mmol/L 103   ANION GAP mmol/L 24.3*   CREATININE mg/dL 1.09   BUN mg/dL 8   BUN / CREAT RATIO  7.3   CALCIUM mg/dL 8.0*   ALK PHOS U/L 96   TOTAL PROTEIN g/dL 7.5   ALT (SGPT) U/L 45*   AST (SGOT) U/L 46*   BILIRUBIN mg/dL 0.3   ALBUMIN g/dL 4.4   GLOBULIN gm/dL 3.1     Estimated Creatinine Clearance: 96.3 mL/min (by C-G formula based on SCr of 1.09 mg/dL).      Results from last 7 days   Lab Units 07/21/24  0743   WBC 10*3/mm3 16.96*   RBC 10*6/mm3 4.90   HEMOGLOBIN g/dL 14.8   HEMATOCRIT % 45.7   MCV fL 93.3   MCH pg 30.2   MCHC g/dL 32.4   RDW % 13.1   RDW-SD fl 44.6   MPV fL 10.7   PLATELETS  10*3/mm3 306   NEUTROPHIL % % 45.0   LYMPHOCYTE % % 45.3   MONOCYTES % % 3.4*   EOSINOPHIL % % 0.9   BASOPHIL % % 0.7   IMM GRAN % % 4.7*   NEUTROS ABS 10*3/mm3 7.63*   LYMPHS ABS 10*3/mm3 7.68*   MONOS ABS 10*3/mm3 0.57   EOS ABS 10*3/mm3 0.16   BASOS ABS 10*3/mm3 0.12   IMMATURE GRANS (ABS) 10*3/mm3 0.80*   NRBC /100 WBC 0.0     Results from last 7 days   Lab Units 07/21/24  1202   PH, ARTERIAL pH units 7.254*   PO2 ART mm Hg 355.1*   PCO2, ARTERIAL mm Hg 43.1   HCO3 ART mmol/L 19.1*     Results from last 7 days   Lab Units 07/21/24  0743   HSTROP T ng/L 12             Results from last 7 days   Lab Units 07/22/24  0120 07/21/24  1947 07/21/24  1602 07/21/24  1255 07/21/24  0843   LACTATE mmol/L 3.8* 3.1* 3.6* 3.5* 9.0*     Results from last 7 days   Lab Units 07/21/24  1541   INR  1.11*     Microbiology Results (last 10 days)       Procedure Component Value - Date/Time    Culture, CSF - Cerebrospinal Fluid, Lumbar Puncture [[id number]] Collected: 07/21/24 1643    Lab Status: Preliminary result Specimen: Cerebrospinal Fluid from Lumbar Puncture Updated: 07/21/24 1755     Gram Stain No WBCs seen      No organisms seen    Respiratory Panel PCR w/COVID-19(SARS-CoV-2) [facility] In-House, NP Swab in UTM/VTM, 2 HR TAT - Swab, Nasopharynx [[id number]]  (Normal) Collected: 07/21/24 0843    Lab Status: Final result Specimen: Swab from Nasopharynx Updated: 07/21/24 0938     ADENOVIRUS, PCR Not Detected     Coronavirus 229E Not Detected     Coronavirus HKU1 Not Detected     Coronavirus NL63 Not Detected     Coronavirus OC43 Not Detected     COVID19 Not Detected     Human Metapneumovirus Not Detected     Human Rhinovirus/Enterovirus Not Detected     Influenza A PCR Not Detected     Influenza B PCR Not Detected     Parainfluenza Virus 1 Not Detected     Parainfluenza Virus 2 Not Detected     Parainfluenza Virus 3 Not Detected     Parainfluenza Virus 4 Not Detected     RSV, PCR Not Detected     Bordetella pertussis  pcr Not Detected     Bordetella parapertussis PCR Not Detected     Chlamydophila pneumoniae PCR Not Detected     Mycoplasma pneumo by PCR Not Detected    Narrative:      In the setting of a positive respiratory panel with a viral infection PLUS a negative procalcitonin without other underlying concern for bacterial infection, consider observing off antibiotics or discontinuation of antibiotics and continue supportive care. If the respiratory panel is positive for atypical bacterial infection (Bordetella pertussis, Chlamydophila pneumoniae, or Mycoplasma pneumoniae), consider antibiotic de-escalation to target atypical bacterial infection.                cefTRIAXone, 2,000 mg, Intravenous, Q24H  chlorhexidine, 15 mL, Mouth/Throat, Q12H  famotidine, 20 mg, Intravenous, BID  folic acid 1 mg in sodium chloride 0.9 % 50 mL IVPB, 1 mg, Intravenous, Daily  insulin regular, 2-9 Units, Subcutaneous, Q6H  LORazepam, 1 mg, Intravenous, Q6H  senna-docusate sodium, 2 tablet, Oral, BID  thiamine (B-1) IV, 500 mg, Intravenous, Q8H   Followed by  [START ON 7/24/2024] thiamine (B-1) IV, 200 mg, Intravenous, Q8H   Followed by  [START ON 7/28/2024] thiamine, 100 mg, Oral, Daily      dextrose 5 % and lactated Ringer's, 100 mL/hr, Last Rate: 100 mL/hr (07/21/24 2216)  propofol, 5-50 mcg/kg/min, Last Rate: 50 mcg/kg/min (07/22/24 0631)        Diagnostics:  XR Chest 1 View    Result Date: 7/22/2024  CHEST SINGLE VIEW  HISTORY: Respiratory failure.  COMPARISON: AP chest 07/21/2024.  FINDINGS: Endotracheal tube tip is 2.5 cm above the kenya. Patient is rotated to the right. There is cardiomegaly with pulmonary vascular engorgement, hazy airspace opacities demonstrated yesterday exhibit improvement suggesting improving interstitial and alveolar edema or infiltrates. There is no pneumothorax. Mild gas distention of bowel loops in the upper abdomen.      ET tube tip 2.5 cm above the kenya. Improving prominent perihilar and upper lobe  opacities consistent with improving pulmonary edema or infiltrates. No pneumothorax.      CT Head Without Contrast    Result Date: 7/21/2024  CT HEAD WO CONTRAST-  INDICATION: Found down, altered mental status  COMPARISON: None  TECHNIQUE: Routine CT head without IV contrast. Coronal and sagittal reformats. Radiation dose reduction techniques were utilized, including automated exposure control and exposure modulation based on body size.  FINDINGS:  Brain: No intraparenchymal hemorrhage. Preserved gray-white differentiation. No mass effect or midline shift.  Ventricles: No hydrocephalus. No intraventricular hemorrhage.  Extra-axial spaces: No extra-axial hemorrhage or fluid collection.  Osseous structures: No fracture or bone lesion.  Sinuses: Patent mastoid air cells and middle ears. Small amount of mucosal thickening in the ethmoid air cells.      No acute intracranial process.  This report was finalized on 7/21/2024 11:14 AM by Dr. Andre Peraza M.D on Workstation: [id number]      XR Chest 1 View    Result Date: 7/21/2024  ONE-VIEW PORTABLE CHEST AT 8:27 A.M.  HISTORY: Weakness and dizziness. ET tube placement.  FINDINGS: There is cardiomegaly with considerable upper lobe vascular prominence likely related to an element of congestive heart failure. I cannot completely exclude areas of developing pneumonia and continued follow-up evaluation is recommended. An ET tube ends approximately 3.3 cm above the kenya.  This report was finalized on 7/21/2024 8:34 AM by Dr. Mark Wolfe M.D on Workstation: [id number]          G-tube position couple centimeters above the main kenya compared to yesterday there is some improvement particularly in the right upper lobe infiltrate/edema there is still left lung infiltrate/edema    Active Hospital Problems    Diagnosis  POA    **Altered mental state [R41.82]  Yes    Altered mental status [R41.82]  Yes      Resolved Hospital Problems   No resolved problems to display.          Assessment & Plan     Acute encephalopathy probably related to multiple toxins on drug screen and alcohol.  Neurology did LP last night no evidence for CNS infection.  Organ to try and transition him to Precedex see if we can wake him up a little calmer so that we can assess his airway protection a little better, hopefully we can get him off the ventilator today  Acute alcohol intoxication thiamine and folate alcohol withdrawal protocols we do not know his chronic use  Polysubstance abuse obviously multiple drugs in his system this will have to be addressed once he is awake and alert  Pneumonia probable aspiration on Rocephin  Lactic acidosis slowly improving  Ventilator management intubated for airway protection he has copious secretions but he is coughing very vigorously coughing about of the ET tube into his ventilator circuit.  Biggest problem is getting him to awaken, we will get a try some Precedex to see if we can do that spontaneous breathing trial I hope to get him off the ventilator soon  Fluids/electrolytes/nutrition will continue IV fluids if we can get him off the ventilator today then we will have to put a feeding tube down to but I think we can.    Plan for disposition:    Navi Hawley Jr, MD  07/22/24  06:39 EDT    Time: Critical CARE time 38 minutes

## 2024-07-22 NOTE — CASE MANAGEMENT/SOCIAL WORK
Discharge Planning Assessment  [facility]     Patient Name: Edin Alarcon  MRN: [id number]  Today's Date: 7/22/2024    Admit Date: 7/21/2024    Plan: Plan is home with family   Discharge Needs Assessment       Row Name 07/22/24 1611       Living Environment    People in Home sibling(s);other relative(s);other (see comments)    Name(s) of People in Home sister brother in law and neices and nephews    Current Living Arrangements home    Potentially Unsafe Housing Conditions none    In the past 12 months has the electric, gas, oil, or water company threatened to shut off services in your home? No    Primary Care Provided by self    Family Caregiver if Needed none    Quality of Family Relationships supportive    Able to Return to Prior Arrangements no       Resource/Environmental Concerns    Resource/Environmental Concerns none    Transportation Concerns none       Transportation Needs    In the past 12 months, has lack of transportation kept you from medical appointments or from getting medications? no    In the past 12 months, has lack of transportation kept you from meetings, work, or from getting things needed for daily living? No       Food Insecurity    Within the past 12 months, you worried that your food would run out before you got the money to buy more. Never true    Within the past 12 months, the food you bought just didn't last and you didn't have money to get more. Never true       Transition Planning    Patient/Family Anticipates Transition to home with family    Patient/Family Anticipated Services at Transition none    Transportation Anticipated family or friend will provide       Discharge Needs Assessment    Equipment Currently Used at Home none    Concerns to be Addressed discharge planning    Anticipated Changes Related to Illness none    Equipment Needed After Discharge none                   Discharge Plan       Row Name 07/22/24 1612       Plan    Plan Plan is home with family    Plan  Comments CCP spoke to sister and her son at bedside  Face sheet verified.  Pt lives with his sister brother in [facility] and nieces and nephews in an apartment.  He is IADL's.  He ises no DME  He has no HH or rehab history  CCP will follow up with patient once extubated to determine discharge needs                  Continued Care and Services - Admitted Since 7/21/2024    No active coordination exists for this encounter.          Demographic Summary    No documentation.                  Functional Status    No documentation.                  Psychosocial    No documentation.                  Abuse/Neglect    No documentation.                  Legal    No documentation.                  Substance Abuse    No documentation.                  Patient Forms    No documentation.                     Mimi Lubin RN

## 2024-07-22 NOTE — PROGRESS NOTES
"DOS: 2024  NAME: Edin Alarcon   : 1990  PCP: Provider, No Known  Chief Complaint   Patient presents with    Altered Mental Status       Chief complaint: altered mental status  Subjective: Patient new to me today. This is a 34 yo M brought in as a Ming Hicks intoxicated. He was intubated for airway protection. No witnessed seizures since admission. LP was done with normal CSF. EEG showed diffuse slowing.    Nursing staff report that with weaning propofol he becomes agitated and moves all 4 ext.    Objective:  Vital signs: /87   Pulse 98   Temp 99.9 °F (37.7 °C) (Rectal)   Resp 22   Ht 165 cm (64.96\")   Wt 70.6 kg (155 lb 10.3 oz)   SpO2 97%   BMI 25.93 kg/m²    Gen: NAD, vitals reviewed, deeply sedated on propofol  MS: Sedated, responds to pain  CN: PERRL, conjugate gaze  Motor: Brisk w/d to pain all 4 ext.    Laboratory results:  Lab Results   Component Value Date    GLUCOSE 263 (H) 2024    CALCIUM 7.6 (L) 2024     (L) 2024    K 3.4 (L) 2024    CO2 17.6 (L) 2024     2024    BUN 7 2024    CREATININE 0.65 (L) 2024    BCR 10.8 2024    ANIONGAP 14.4 2024     Lab Results   Component Value Date    WBC 21.70 (H) 2024    HGB 13.4 2024    HCT 39.5 2024    MCV 90.6 2024     2024     No results found for: \"LDL\"      Lab 24  1255   HEMOGLOBIN A1C 6.20*        Review of labs: glucose 263, Na 134, WBC 22, A1C 6.2%    CSF normal    Review and interpretation of imaging: CTH from admission personally reviewed and looks essentially normal for age    Diagnoses:  Toxic encephalopathy, severe  Acute hypoxic respiratory failure  Alcohol abuse, suspect currently in withdrawal  ICU agitation    Comment: Severe toxic encephalopathy, felt to be related to cocaine and alcohol intoxication    Plan:  1. Wean sedation as tolerated  2. Continue supportive care  3. ? CIWA protocol. Will defer to  " Chandan  4. No indication for MRI as of yet    Management discussed with Dr. Hawley and nursing staff.

## 2024-07-22 NOTE — SIGNIFICANT NOTE
07/22/24 0601   OTHER   Discipline speech language pathologist   Rehab Time/Intention   Session Not Performed other (see comments)  (Orders received per protocol for failed dysphagia screen. Patient intubated. SLP to sign off at this time. Please reconsult as indicated.)

## 2024-07-22 NOTE — PLAN OF CARE
Goal Outcome Evaluation:  Plan of Care Reviewed With: patient        Progress: no change    Patient extubated at 1500. Able to follow all commands with family instructions. Meza removed. Febrile due to PNA.

## 2024-07-23 LAB
ANION GAP SERPL CALCULATED.3IONS-SCNC: 9 MMOL/L (ref 5–15)
BUN SERPL-MCNC: 8 MG/DL (ref 6–20)
BUN/CREAT SERPL: 11.6 (ref 7–25)
CALCIUM SPEC-SCNC: 9 MG/DL (ref 8.6–10.5)
CHLORIDE SERPL-SCNC: 107 MMOL/L (ref 98–107)
CO2 SERPL-SCNC: 21 MMOL/L (ref 22–29)
CREAT SERPL-MCNC: 0.69 MG/DL (ref 0.76–1.27)
DEPRECATED RDW RBC AUTO: 42.8 FL (ref 37–54)
DEPRECATED RDW RBC AUTO: 43.8 FL (ref 37–54)
EGFRCR SERPLBLD CKD-EPI 2021: 125.3 ML/MIN/1.73
ERYTHROCYTE [DISTWIDTH] IN BLOOD BY AUTOMATED COUNT: 12.7 % (ref 12.3–15.4)
ERYTHROCYTE [DISTWIDTH] IN BLOOD BY AUTOMATED COUNT: 13.1 % (ref 12.3–15.4)
GLUCOSE SERPL-MCNC: 107 MG/DL (ref 65–99)
HCT VFR BLD AUTO: 29.1 % (ref 37.5–51)
HCT VFR BLD AUTO: 42.2 % (ref 37.5–51)
HGB BLD-MCNC: 13.9 G/DL (ref 13–17.7)
HGB BLD-MCNC: 9.8 G/DL (ref 13–17.7)
MAGNESIUM SERPL-MCNC: 2.4 MG/DL (ref 1.6–2.6)
MCH RBC QN AUTO: 30 PG (ref 26.6–33)
MCH RBC QN AUTO: 31 PG (ref 26.6–33)
MCHC RBC AUTO-ENTMCNC: 32.9 G/DL (ref 31.5–35.7)
MCHC RBC AUTO-ENTMCNC: 33.7 G/DL (ref 31.5–35.7)
MCV RBC AUTO: 91.1 FL (ref 79–97)
MCV RBC AUTO: 92.1 FL (ref 79–97)
PHOSPHATE SERPL-MCNC: 2.6 MG/DL (ref 2.5–4.5)
PLATELET # BLD AUTO: 156 10*3/MM3 (ref 140–450)
PLATELET # BLD AUTO: 240 10*3/MM3 (ref 140–450)
PMV BLD AUTO: 10.8 FL (ref 6–12)
PMV BLD AUTO: 10.8 FL (ref 6–12)
POTASSIUM SERPL-SCNC: 4.4 MMOL/L (ref 3.5–5.2)
QT INTERVAL: 352 MS
QT INTERVAL: 410 MS
QT INTERVAL: 443 MS
QTC INTERVAL: 436 MS
QTC INTERVAL: 502 MS
QTC INTERVAL: 511 MS
RBC # BLD AUTO: 3.16 10*6/MM3 (ref 4.14–5.8)
RBC # BLD AUTO: 4.63 10*6/MM3 (ref 4.14–5.8)
SODIUM SERPL-SCNC: 137 MMOL/L (ref 136–145)
WBC NRBC COR # BLD AUTO: 13.84 10*3/MM3 (ref 3.4–10.8)
WBC NRBC COR # BLD AUTO: 14.1 10*3/MM3 (ref 3.4–10.8)

## 2024-07-23 PROCEDURE — 85027 COMPLETE CBC AUTOMATED: CPT | Performed by: INTERNAL MEDICINE

## 2024-07-23 PROCEDURE — 25010000002 CEFTRIAXONE PER 250 MG: Performed by: INTERNAL MEDICINE

## 2024-07-23 PROCEDURE — 80048 BASIC METABOLIC PNL TOTAL CA: CPT | Performed by: INTERNAL MEDICINE

## 2024-07-23 PROCEDURE — 25010000002 LORAZEPAM PER 2 MG: Performed by: INTERNAL MEDICINE

## 2024-07-23 PROCEDURE — 84100 ASSAY OF PHOSPHORUS: CPT | Performed by: INTERNAL MEDICINE

## 2024-07-23 PROCEDURE — 83735 ASSAY OF MAGNESIUM: CPT | Performed by: INTERNAL MEDICINE

## 2024-07-23 PROCEDURE — 90791 PSYCH DIAGNOSTIC EVALUATION: CPT

## 2024-07-23 PROCEDURE — 25010000002 THIAMINE HCL 200 MG/2ML SOLUTION 2 ML VIAL: Performed by: INTERNAL MEDICINE

## 2024-07-23 RX ORDER — FOLIC ACID 1 MG/1
1 TABLET ORAL DAILY
Status: DISCONTINUED | OUTPATIENT
Start: 2024-07-24 | End: 2024-07-25 | Stop reason: HOSPADM

## 2024-07-23 RX ADMIN — SENNOSIDES AND DOCUSATE SODIUM 2 TABLET: 50; 8.6 TABLET ORAL at 20:58

## 2024-07-23 RX ADMIN — LORAZEPAM 1 MG: 2 INJECTION INTRAMUSCULAR; INTRAVENOUS at 17:34

## 2024-07-23 RX ADMIN — CEFTRIAXONE 2000 MG: 2 INJECTION, POWDER, FOR SOLUTION INTRAMUSCULAR; INTRAVENOUS at 23:57

## 2024-07-23 RX ADMIN — LORAZEPAM 1 MG: 2 INJECTION INTRAMUSCULAR; INTRAVENOUS at 23:57

## 2024-07-23 RX ADMIN — THIAMINE HYDROCHLORIDE 500 MG: 100 INJECTION, SOLUTION INTRAMUSCULAR; INTRAVENOUS at 23:57

## 2024-07-23 RX ADMIN — LORAZEPAM 1 MG: 2 INJECTION INTRAMUSCULAR; INTRAVENOUS at 11:03

## 2024-07-23 RX ADMIN — CEFTRIAXONE 2000 MG: 2 INJECTION, POWDER, FOR SOLUTION INTRAMUSCULAR; INTRAVENOUS at 00:26

## 2024-07-23 RX ADMIN — THIAMINE HYDROCHLORIDE 500 MG: 100 INJECTION, SOLUTION INTRAMUSCULAR; INTRAVENOUS at 08:44

## 2024-07-23 RX ADMIN — SENNOSIDES AND DOCUSATE SODIUM 2 TABLET: 50; 8.6 TABLET ORAL at 08:06

## 2024-07-23 RX ADMIN — ACETAMINOPHEN 325MG 650 MG: 325 TABLET ORAL at 00:26

## 2024-07-23 RX ADMIN — FOLIC ACID 1 MG: 5 INJECTION, SOLUTION INTRAMUSCULAR; INTRAVENOUS; SUBCUTANEOUS at 08:06

## 2024-07-23 NOTE — PROGRESS NOTES
LOS: 2 days   Patient Care Team:  Provider, No Known as PCP - General    Subjective     Patient was found down apparently in the hallway of an apartment complex he was febrile he had some unusual movements question seizure.  His drug screens were positive for cocaine, fentanyl and benzodiazepines and his blood alcohol level is 183  Patient is now awake and alert he speaks minimal English is not very forthcoming regarding the events that led up to his hospitalization denies any drug use.  Says he only drinks alcohol 2 or 3 times a month.  Review of Systems:          Objective     Vital Signs  Vital Sign Min/Max for last 24 hours  Temp  Min: 96.3 °F (35.7 °C)  Max: 102.4 °F (39.1 °C)   BP  Min: 111/75  Max: 153/120   Pulse  Min: 79  Max: 110   Resp  Min: 16  Max: 24   SpO2  Min: 87 %  Max: 100 %   Flow (L/min)  Min: 3  Max: 4   No data recorded        Ventilator/Non-Invasive Ventilation Settings (From admission, onward)       Start     Ordered    07/21/24 1215  Ventilator - Vent Mode: AC/VC; Rate: Other; Rate: 24; FiO2: Titrate Per SpO2; Titrate Oxygen for SpO2: 90 - 95%; PEEP: 5; Tidal Volume: mL; TV: 550  Continuous        Question Answer Comment   Vent Mode AC/VC    Rate Other    Rate 24    FiO2 Titrate Per SpO2    Titrate Oxygen for SpO2 90 - 95%    PEEP 5    Tidal Volume mL            07/21/24 1215    07/21/24 0830  Ventilator - Vent Mode: AC/VC; FiO2: Titrate Per SpO2; Titrate Oxygen for SpO2: 90 - 95%  Continuous,   Status:  Canceled        Question Answer Comment   Vent Mode AC/VC    FiO2 Titrate Per SpO2    Titrate Oxygen for SpO2 90 - 95%        07/21/24 0834                                 Body mass index is 25.93 kg/m².  I/O last 3 completed shifts:  In: 6263 [I.V.:3463; IV Piggyback:2800]  Out: 7375 [Urine:7375]  I/O this shift:  In: 100 [IV Piggyback:100]  Out: 2000 [Urine:2000]        Physical Exam:  General Appearance: We have a well-developed  male resting in bed in no apparent  distress  Eyes: Conjunctiva clear and anicteric pupils are equal and reactive to light they are about 3mm  ENT: Mucous membranes are moist no erythema no exudate  Neck: No adenopathy or thyromegaly no jugular venous distension trachea midline  Lungs: He still has some coarse crackles rhonchi in the bases right greater than left and cough with deep breath.  Cardiac: Regular rate rhythm no murmur  Abdomen: Soft no palpable hepatosplenomegaly or masses  : Not examined  Musculoskeletal: Grossly normal  Skin: Warm and dry no jaundice no petechiae  Neuro: Is communicating actually pretty well now seems to be responding appropriately moving all extremities to command  Extremities/P Vascular: No clubbing no cyanosis no edema palpable radial and dorsalis pedis pulses  MSE: He seems to be in pretty good spirits this morning       Labs:  Results from last 7 days   Lab Units 07/22/24  1727 07/22/24  0425 07/21/24  0743   GLUCOSE mg/dL  --  263* 378*   SODIUM mmol/L  --  134* 141   POTASSIUM mmol/L 3.6 3.4* 3.4*   MAGNESIUM mg/dL  --  1.5* 2.4*   CO2 mmol/L  --  17.6* 13.7*   CHLORIDE mmol/L  --  102 103   ANION GAP mmol/L  --  14.4 24.3*   CREATININE mg/dL  --  0.65* 1.09   BUN mg/dL  --  7 8   BUN / CREAT RATIO   --  10.8 7.3   CALCIUM mg/dL  --  7.6* 8.0*   ALK PHOS U/L  --   --  96   TOTAL PROTEIN g/dL  --   --  7.5   ALT (SGPT) U/L  --   --  45*   AST (SGOT) U/L  --   --  46*   BILIRUBIN mg/dL  --   --  0.3   ALBUMIN g/dL  --   --  4.4   GLOBULIN gm/dL  --   --  3.1     Estimated Creatinine Clearance: 161.4 mL/min (A) (by C-G formula based on SCr of 0.65 mg/dL (L)).      Results from last 7 days   Lab Units 07/23/24  0402 07/22/24  0831 07/21/24  0743   WBC 10*3/mm3 13.84* 21.70* 16.96*   RBC 10*6/mm3 3.16* 4.36 4.90   HEMOGLOBIN g/dL 9.8* 13.4 14.8   HEMATOCRIT % 29.1* 39.5 45.7   MCV fL 92.1 90.6 93.3   MCH pg 31.0 30.7 30.2   MCHC g/dL 33.7 33.9 32.4   RDW % 12.7 13.1 13.1   RDW-SD fl 42.8 43.9 44.6   MPV fL 10.8 10.7  10.7   PLATELETS 10*3/mm3 156 223 306   NEUTROPHIL % %  --  77.2* 45.0   LYMPHOCYTE % %  --  15.5* 45.3   MONOCYTES % %  --  6.2 3.4*   EOSINOPHIL % %  --  0.2* 0.9   BASOPHIL % %  --  0.3 0.7   IMM GRAN % %  --  0.6* 4.7*   NEUTROS ABS 10*3/mm3  --  16.75* 7.63*   LYMPHS ABS 10*3/mm3  --  3.37* 7.68*   MONOS ABS 10*3/mm3  --  1.35* 0.57   EOS ABS 10*3/mm3  --  0.05 0.16   BASOS ABS 10*3/mm3  --  0.06 0.12   IMMATURE GRANS (ABS) 10*3/mm3  --  0.12* 0.80*   NRBC /100 WBC  --  0.0 0.0     Results from last 7 days   Lab Units 07/22/24  1448   PH, ARTERIAL pH units 7.438   PO2 ART mm Hg 72.6*   PCO2, ARTERIAL mm Hg 37.6   HCO3 ART mmol/L 25.4     Results from last 7 days   Lab Units 07/21/24  0743   HSTROP T ng/L 12             Results from last 7 days   Lab Units 07/22/24  0831 07/22/24  0425 07/22/24  0120 07/21/24  1947 07/21/24  1602 07/21/24  1255 07/21/24  0843   LACTATE mmol/L 2.3*  --  3.8* 3.1* 3.6* 3.5* 9.0*   PROCALCITONIN ng/mL  --  0.69*  --   --   --   --   --      Results from last 7 days   Lab Units 07/21/24  1541   INR  1.11*     Microbiology Results (last 10 days)       Procedure Component Value - Date/Time    Respiratory Culture - Sputum, Cough [[id number]] Collected: 07/22/24 0852    Lab Status: Preliminary result Specimen: Sputum from Cough Updated: 07/22/24 1448     Gram Stain Many (4+) WBCs seen      No Epithelial cells seen      Occasional Gram positive cocci    Culture, CSF - Cerebrospinal Fluid, Lumbar Puncture [[id number]] Collected: 07/21/24 1647    Lab Status: Preliminary result Specimen: Cerebrospinal Fluid from Lumbar Puncture Updated: 07/22/24 0706     CSF Culture No growth     Gram Stain No WBCs seen      No organisms seen    Blood Culture - Blood, Arm, Left [[id number]]  (Normal) Collected: 07/21/24 1026    Lab Status: Preliminary result Specimen: Blood from Arm, Left Updated: 07/22/24 1030     Blood Culture No growth at 24 hours    Blood Culture - Blood, Arm, Right [[id number]]  (Normal)  Collected: 07/21/24 1026    Lab Status: Preliminary result Specimen: Blood from Arm, Right Updated: 07/22/24 1045     Blood Culture No growth at 24 hours    Respiratory Panel PCR w/COVID-19(SARS-CoV-2) SANJEEV/ANDERS/MARY/PAD/COR/JANN In-House, NP Swab in UTM/VTM, 2 HR TAT - Swab, Nasopharynx [[id number]]  (Normal) Collected: 07/21/24 0843    Lab Status: Final result Specimen: Swab from Nasopharynx Updated: 07/21/24 0938     ADENOVIRUS, PCR Not Detected     Coronavirus 229E Not Detected     Coronavirus HKU1 Not Detected     Coronavirus NL63 Not Detected     Coronavirus OC43 Not Detected     COVID19 Not Detected     Human Metapneumovirus Not Detected     Human Rhinovirus/Enterovirus Not Detected     Influenza A PCR Not Detected     Influenza B PCR Not Detected     Parainfluenza Virus 1 Not Detected     Parainfluenza Virus 2 Not Detected     Parainfluenza Virus 3 Not Detected     Parainfluenza Virus 4 Not Detected     RSV, PCR Not Detected     Bordetella pertussis pcr Not Detected     Bordetella parapertussis PCR Not Detected     Chlamydophila pneumoniae PCR Not Detected     Mycoplasma pneumo by PCR Not Detected    Narrative:      In the setting of a positive respiratory panel with a viral infection PLUS a negative procalcitonin without other underlying concern for bacterial infection, consider observing off antibiotics or discontinuation of antibiotics and continue supportive care. If the respiratory panel is positive for atypical bacterial infection (Bordetella pertussis, Chlamydophila pneumoniae, or Mycoplasma pneumoniae), consider antibiotic de-escalation to target atypical bacterial infection.                cefTRIAXone, 2,000 mg, Intravenous, Q24H  famotidine, 20 mg, Intravenous, BID  folic acid 1 mg in sodium chloride 0.9 % 50 mL IVPB, 1 mg, Intravenous, Daily  LORazepam, 1 mg, Intravenous, Q6H  senna-docusate sodium, 2 tablet, Oral, BID  thiamine (B-1) IV, 500 mg, Intravenous, Q8H   Followed by  [START ON 7/24/2024]  thiamine (B-1) IV, 200 mg, Intravenous, Q8H   Followed by  [START ON 7/28/2024] thiamine, 100 mg, Oral, Daily             Diagnostics:  XR Chest 1 View    Result Date: 7/22/2024  CHEST SINGLE VIEW  HISTORY: Respiratory failure.  COMPARISON: AP chest 07/21/2024.  FINDINGS: Endotracheal tube tip is 2.5 cm above the kenya. Patient is rotated to the right. There is cardiomegaly with pulmonary vascular engorgement, hazy airspace opacities demonstrated yesterday exhibit improvement suggesting improving interstitial and alveolar edema or infiltrates. There is no pneumothorax. Mild gas distention of bowel loops in the upper abdomen.      ET tube tip 2.5 cm above the kenya. Improving prominent perihilar and upper lobe opacities consistent with improving pulmonary edema or infiltrates. No pneumothorax.      CT Head Without Contrast    Result Date: 7/21/2024  CT HEAD WO CONTRAST-  INDICATION: Found down, altered mental status  COMPARISON: None  TECHNIQUE: Routine CT head without IV contrast. Coronal and sagittal reformats. Radiation dose reduction techniques were utilized, including automated exposure control and exposure modulation based on body size.  FINDINGS:  Brain: No intraparenchymal hemorrhage. Preserved gray-white differentiation. No mass effect or midline shift.  Ventricles: No hydrocephalus. No intraventricular hemorrhage.  Extra-axial spaces: No extra-axial hemorrhage or fluid collection.  Osseous structures: No fracture or bone lesion.  Sinuses: Patent mastoid air cells and middle ears. Small amount of mucosal thickening in the ethmoid air cells.      No acute intracranial process.  This report was finalized on 7/21/2024 11:14 AM by Dr. Andre Peraza M.D on Workstation: [id number]      XR Chest 1 View    Result Date: 7/21/2024  ONE-VIEW PORTABLE CHEST AT 8:27 A.M.  HISTORY: Weakness and dizziness. ET tube placement.  FINDINGS: There is cardiomegaly with considerable upper lobe vascular prominence likely  related to an element of congestive heart failure. I cannot completely exclude areas of developing pneumonia and continued follow-up evaluation is recommended. An ET tube ends approximately 3.3 cm above the kenya.  This report was finalized on 7/21/2024 8:34 AM by Dr. Mark Wolfe M.D on Workstation: [id number]          G-tube position couple centimeters above the main kenya compared to yesterday there is some improvement particularly in the right upper lobe infiltrate/edema there is still left lung infiltrate/edema    Active Hospital Problems    Diagnosis  POA    **Altered mental state [R41.82]  Yes    Altered mental status [R41.82]  Yes      Resolved Hospital Problems   No resolved problems to display.         Assessment & Plan     Acute encephalopathy probably related to multiple toxins on drug screen and alcohol.  Neurology did LP last night no evidence for CNS infection.  Think the rapid improvement supports this being a metabolic/toxic encephalopathy  Acute alcohol intoxication thiamine and folate alcohol withdrawal protocols we do not know his chronic use sounds like he just binge drinks several times a month.  Polysubstance abuse obviously multiple drugs in his system will have access to see the patient  Pneumonia probable aspiration on Rocephin he will need to complete a course of antibiotics  Lactic acidosis slowly improving  Ventilator management patient extubated yesterday  Fluids/electrolytes/nutrition patient passed swallow eval yesterday taking p.o. well.  Anemia big change in his hemoglobin overnight no evidence of bleeding I am going to repeat a CBC    Plan for disposition: Out of ICU today, mobilize get access to see him follow-up on hemoglobin he may be ready to go home as early as tomorrow    Navi Hawley Jr, MD  07/23/24  06:28 EDT    Time: Time 38 minutes on patient care today

## 2024-07-23 NOTE — PLAN OF CARE
Problem: Fall Injury Risk  Goal: Absence of Fall and Fall-Related Injury  Outcome: Ongoing, Progressing  Intervention: Identify and Manage Contributors  Recent Flowsheet Documentation  Taken 7/23/2024 1611 by Stella Michelle RN  Medication Review/Management: medications reviewed  Intervention: Promote Injury-Free Environment  Recent Flowsheet Documentation  Taken 7/23/2024 1800 by Stella Michelle RN  Safety Promotion/Fall Prevention:   safety round/check completed   nonskid shoes/slippers when out of bed   fall prevention program maintained  Taken 7/23/2024 1611 by Stella Michelle RN  Safety Promotion/Fall Prevention:   safety round/check completed   muscle strengthening facilitated   fall prevention program maintained     Problem: Fall Injury Risk  Goal: Absence of Fall and Fall-Related Injury  Intervention: Promote Injury-Free Environment  Recent Flowsheet Documentation  Taken 7/23/2024 1800 by Stella Michelle RN  Safety Promotion/Fall Prevention:   safety round/check completed   nonskid shoes/slippers when out of bed   fall prevention program maintained  Taken 7/23/2024 1611 by Stella Michelle RN  Safety Promotion/Fall Prevention:   safety round/check completed   muscle strengthening facilitated   fall prevention program maintained     Problem: Adult Inpatient Plan of Care  Goal: Plan of Care Review  Outcome: Ongoing, Progressing   Goal Outcome Evaluation: patient arrived to floor from ICU. Alert and oriented, VSS. In bed with call light within reach. Bed alarm on.

## 2024-07-23 NOTE — CONSULTS
"Patient seen by [facility] d/t ETOH/drugs. Patient interviewed alone in room 372 (ICU). Introduced self and role. Patient agreed to participate in evaluation. Patient is A/OX4. Patient is Armenian speaking. IPAD  utilized.     The patient is a 33 y.o. male living in an apartment with multiple family members. The patient stated he came to the [place] 2 [place] ago.   Anabaptist/Spiritual: Attends Pentecostalism [employer]  Children: None  Occupation: Fencing  Legal: Denies  : no  Support System: Brother  Feel Safe at Home: Yes. The patient stated having his family around helps him feel safe.     The patient presented to the ED on 7/21/24 after being found unresponsive in the hallway of his apartment building hallway. The patient received Narcan without improvement and required intubation. The patient was extubated on  7/22/24. The patient's UDS was positive for Fentanyl, cocaine, and benzodiazepines. The patient had a BAL of 183.     The patient voiced he drinks beer. The patient had difficulty quantifying how many beers he typically drinks but stated he does drink much during the week but drinks more on the weekends. The patient this was his first time using Fentanyl or benzos and used it via snorting. The patient he was already using ETOH so he decided to try the other drugs. The patient was educated on the dangers of Fentanyl and especially mixing it with ETOH. The patient voiced regret over his use and stated he is never going to use it again. The patient voiced he has used cocaine before but stated \"not that much\" when asked how often he uses. The patient voiced he will use cocaine if it is there but it is not that often. The patient stated this was first time he has overdosed. The patient denied any history of [facility] treatment.     The patient denied any mental health history or treatment. The patient denied being on any mental health medications.     The patient voiced anxiety and depression r/t " "his current situation and being in the hospital. The patient denied any issues with anxiety or depression before this current situation. The patient denied SI or wish to be dead and stated \"I thank God I'm here.\" The patient denied HI or hallucinations.     The patient voiced regret over his current situation and stated he is not going to use drugs any more. The patient voiced he is interested in [facility] treatment. The patient was given [facility] treatment resources. Access Center will follow.   "

## 2024-07-23 NOTE — NURSING NOTE
[facility]  [phone]    DECLARACIÓN DE DISPOSICIÓN DEL CENTRO DE ACCESO          Yo, Esaú Baumann Dorian, fui evaluado en el [facility] el 7/23/2024.  Entiendo las recomendaciones a continuación y qué acción de seguimiento se espera de mí.    [facility]  [address] [place], [place] [id number] [phone]    [facility]  [address] [place] [address] [phone] o [phone]    [facility]  [address] [place] [address] [phone]

## 2024-07-23 NOTE — PROGRESS NOTES
Noted improvement in mental status and extubation. Initial presentation consistent with toxic encephalopathy. I will hold off on additional evaluation unless there are further concerns for a primary neurologic issue.

## 2024-07-24 ENCOUNTER — APPOINTMENT (OUTPATIENT)
Dept: GENERAL RADIOLOGY | Facility: HOSPITAL | Age: 34
End: 2024-07-24

## 2024-07-24 LAB
BACTERIA SPEC AEROBE CULT: NORMAL
DEPRECATED RDW RBC AUTO: 42 FL (ref 37–54)
ERYTHROCYTE [DISTWIDTH] IN BLOOD BY AUTOMATED COUNT: 12.8 % (ref 12.3–15.4)
GRAM STN SPEC: NORMAL
GRAM STN SPEC: NORMAL
HCT VFR BLD AUTO: 39.3 % (ref 37.5–51)
HGB BLD-MCNC: 13.3 G/DL (ref 13–17.7)
MCH RBC QN AUTO: 30.4 PG (ref 26.6–33)
MCHC RBC AUTO-ENTMCNC: 33.8 G/DL (ref 31.5–35.7)
MCV RBC AUTO: 89.7 FL (ref 79–97)
PLATELET # BLD AUTO: 273 10*3/MM3 (ref 140–450)
PMV BLD AUTO: 10.6 FL (ref 6–12)
RBC # BLD AUTO: 4.38 10*6/MM3 (ref 4.14–5.8)
WBC NRBC COR # BLD AUTO: 13.15 10*3/MM3 (ref 3.4–10.8)

## 2024-07-24 PROCEDURE — 71045 X-RAY EXAM CHEST 1 VIEW: CPT

## 2024-07-24 PROCEDURE — 25010000002 THIAMINE PER 100 MG: Performed by: INTERNAL MEDICINE

## 2024-07-24 PROCEDURE — 25010000002 LORAZEPAM PER 2 MG: Performed by: INTERNAL MEDICINE

## 2024-07-24 PROCEDURE — 25010000002 THIAMINE HCL 200 MG/2ML SOLUTION 2 ML VIAL: Performed by: INTERNAL MEDICINE

## 2024-07-24 PROCEDURE — 25010000002 THIAMINE HCL 200 MG/2ML SOLUTION: Performed by: INTERNAL MEDICINE

## 2024-07-24 PROCEDURE — 85027 COMPLETE CBC AUTOMATED: CPT | Performed by: INTERNAL MEDICINE

## 2024-07-24 RX ADMIN — FOLIC ACID 1 MG: 1 TABLET ORAL at 14:47

## 2024-07-24 RX ADMIN — THIAMINE HYDROCHLORIDE 500 MG: 100 INJECTION, SOLUTION INTRAMUSCULAR; INTRAVENOUS at 06:14

## 2024-07-24 RX ADMIN — THIAMINE HYDROCHLORIDE 200 MG: 100 INJECTION, SOLUTION INTRAMUSCULAR; INTRAVENOUS at 14:47

## 2024-07-24 RX ADMIN — SENNOSIDES AND DOCUSATE SODIUM 2 TABLET: 50; 8.6 TABLET ORAL at 10:04

## 2024-07-24 RX ADMIN — THIAMINE HYDROCHLORIDE 200 MG: 100 INJECTION, SOLUTION INTRAMUSCULAR; INTRAVENOUS at 21:26

## 2024-07-24 RX ADMIN — LORAZEPAM 1 MG: 2 INJECTION INTRAMUSCULAR; INTRAVENOUS at 06:14

## 2024-07-24 NOTE — PLAN OF CARE
Goal Outcome Evaluation:      Patient Tongan speaking only here found down due to drug overdose. Patient has been alert and oriented and getting antibiotics. SBA x1 to BR. VSS. Will DC tomorrow or 7/26. Will  cont to monitor

## 2024-07-24 NOTE — PROGRESS NOTES
Consult Daily Progress Note  [facility]   07/24/24      Patient Name:  Edin Alarcon  MRN:  [id number]   YOB: 1990  Age: 33 y.o.  Sex: male  LOS: 3    Reason for Consult:  Found unresponsive    Hospital Course:   33-year-old male who was found unresponsive and brought to the hospital and found to be positive for cocaine, fentanyl, benzodiazepines, significant blood alcohol level.  Mental status improved and patient was transferred out of the ICU 7/23.    Interval History:  No acute events overnight  Transferred out of ICU  States that he feels well  Denies any chest pain or palpitations  No nausea or vomiting  No shortness of breath or cough  Unable to go home today as he does not have a ride  Understands that he needs to refrain from drugs and alcohol    Physical Exam:  Vitals:    07/24/24 1115   BP: 121/79   Pulse: 79   Resp: 18   Temp:    SpO2: 94%       Intake/Output  No intake or output data in the 24 hours ending 07/24/24 1604    General: Alert, nontoxic, NAD  HEENT: NC/AT, EOMI, MMM  Neck: Supple, trachea midline  Cardiac: RRR, no murmur, gallops, rubs  Pulmonary: Clear to auscultation bilaterally, no adventitious breath sounds, normal respiratory effort  GI: Soft, non-tender, non-distended, normal bowel sounds  Extremities: Warm, well perfused, no LE edema  Skin: no visible rash  Neuro: CN II - XII grossly intact  Psychiatry: Normal mood and affect      Data Review:  Results from last 7 days   Lab Units 07/24/24  0550 07/23/24  1337 07/23/24  0402 07/22/24  0831 07/21/24  0743   WBC 10*3/mm3 13.15* 14.10* 13.84* 21.70* 16.96*   HEMOGLOBIN g/dL 13.3 13.9 9.8* 13.4 14.8   PLATELETS 10*3/mm3 273 240 156 223 306     Results from last 7 days   Lab Units 07/23/24  0736 07/22/24  1727 07/22/24  0425 07/21/24  0743   SODIUM mmol/L 137  --  134* 141   POTASSIUM mmol/L 4.4 3.6 3.4* 3.4*   CHLORIDE mmol/L 107  --  102 103   CO2 mmol/L 21.0*  --  17.6* 13.7*   BUN mg/dL 8  --  7  8   CREATININE mg/dL 0.69*  --  0.65* 1.09   GLUCOSE mg/dL 107*  --  263* 378*   CALCIUM mg/dL 9.0  --  7.6* 8.0*   MAGNESIUM mg/dL 2.4  --  1.5* 2.4*   PHOSPHORUS mg/dL 2.6 1.6* 2.0*  --    Estimated Creatinine Clearance: 152.1 mL/min (A) (by C-G formula based on SCr of 0.69 mg/dL (L)).    Results from last 7 days   Lab Units 07/24/24  0550 07/23/24  1337 07/23/24  0402 07/22/24  0831 07/22/24  0425 07/22/24  0120 07/21/24  1947 07/21/24  0843 07/21/24  0743   AST (SGOT) U/L  --   --   --   --   --   --   --   --  46*   ALT (SGPT) U/L  --   --   --   --   --   --   --   --  45*   PROCALCITONIN ng/mL  --   --   --   --  0.69*  --   --   --   --    LACTATE mmol/L  --   --   --  2.3*  --  3.8* 3.1*   < >  --    PLATELETS 10*3/mm3 273 240 156 223  --   --   --   --  306    < > = values in this interval not displayed.       Results from last 7 days   Lab Units 07/22/24  1448 07/21/24  1202 07/21/24  0843   PH, ARTERIAL pH units 7.438 7.254* 7.097*   PCO2, ARTERIAL mm Hg 37.6 43.1 56.8*   PO2 ART mm Hg 72.6* 355.1* 300.2*   HCO3 ART mmol/L 25.4 19.1* 17.5*         Imaging:  Reviewed chest images personally from past 3 days    ASSESSMENT  /  PLAN:    Acute encephalopathy  Acute alcohol intoxication  Polysubstance abuse  Pneumonia  Lactic acidosis-resolved  Ventilator management-extubated (7/22)  Anemia    -Patient initially brought to the hospital for unresponsiveness and ultimately intubated for airway protection.  Found to have acute encephalopathy likely secondary to drug use.  Multiple drugs positive on urine drug screen and alcohol intoxication.  -Extubated 7/22, stable respiratory status  -Mental status returned to normal  -On CIWA protocol, CIWA scores have been 0 for over 24 hours  -Leukocytosis is improving, hemoglobin stable  -On ceftriaxone for pneumonia, will need to complete a course.  -Discussed with him needing to refrain from alcohol and substance abuse due to the degree of intoxication he was found in,  this could have been deadly.  He attest understanding.  -Stable for discharge, unable to go home today due to ride issue.  Will discharge home tomorrow.    All issues new to me today.  Prior hospital course, labs and imaging reviewed.    Disposition: Discharge home tomorrow.    Chato De Jesus MD  [facility]  Pulmonary and Critical Care Medicine, Interventional Pulmonology    Parts of this note may be an electronic transcription/translation of spoken language to printed text using the Dragon dictation system.

## 2024-07-24 NOTE — NURSING NOTE
Access center follow up regarding [facility]: chart reviewed. Pt was provided with [facility] resources. Last CIWA score of 0. No acute changes overnight and progressing towards goal. Following.

## 2024-07-24 NOTE — PLAN OF CARE
Problem: Fall Injury Risk  Goal: Absence of Fall and Fall-Related Injury  Outcome: Ongoing, Progressing  Intervention: Identify and Manage Contributors  Recent Flowsheet Documentation  Taken 7/24/2024 0143 by Annie Castelan RN  Medication Review/Management: medications reviewed  Taken 7/24/2024 0000 by Annie Castelan RN  Medication Review/Management: medications reviewed  Taken 7/23/2024 2200 by Annie Castelan RN  Medication Review/Management: medications reviewed  Taken 7/23/2024 2000 by Annie Castelan RN  Medication Review/Management: medications reviewed  Intervention: Promote Injury-Free Environment  Recent Flowsheet Documentation  Taken 7/24/2024 0143 by Annie Castelan RN  Safety Promotion/Fall Prevention:   activity supervised   assistive device/personal items within reach   clutter free environment maintained   safety round/check completed  Taken 7/24/2024 0000 by Annie Castelan RN  Safety Promotion/Fall Prevention:   activity supervised   assistive device/personal items within reach   clutter free environment maintained   safety round/check completed  Taken 7/23/2024 2200 by Annie Castelan RN  Safety Promotion/Fall Prevention:   activity supervised   assistive device/personal items within reach   clutter free environment maintained   safety round/check completed  Taken 7/23/2024 2000 by Annie Castelan RN  Safety Promotion/Fall Prevention: safety round/check completed     Problem: Skin Injury Risk Increased  Goal: Skin Health and Integrity  Outcome: Ongoing, Progressing  Intervention: Optimize Skin Protection  Recent Flowsheet Documentation  Taken 7/24/2024 0143 by Annie Castelan RN  Head of Bed (HOB) Positioning: HOB at 30 degrees  Taken 7/24/2024 0000 by Annie Castelan RN  Head of Bed (HOB) Positioning: HOB at 30 degrees  Taken 7/23/2024 2200 by Annie Castelan RN  Head of Bed (HOB) Positioning: HOB at 30 degrees  Taken 7/23/2024 2000 by Annie Castelan RN  Pressure Reduction Techniques:  frequent weight shift encouraged  Head of Bed (HOB) Positioning: HOB at 20 degrees  Pressure Reduction Devices: positioning supports utilized  Skin Protection: adhesive use limited     Problem: Adjustment to Illness (Sepsis/Septic Shock)  Goal: Optimal Coping  Outcome: Ongoing, Progressing  Intervention: Optimize Psychosocial Adjustment to Illness  Recent Flowsheet Documentation  Taken 7/23/2024 2000 by Annie Castelan RN  Supportive Measures: active listening utilized  Family/Support System Care: self-care encouraged     Problem: Bleeding (Sepsis/Septic Shock)  Goal: Absence of Bleeding  Outcome: Ongoing, Progressing     Problem: Glycemic Control Impaired (Sepsis/Septic Shock)  Goal: Blood Glucose Level Within Desired Range  Outcome: Ongoing, Progressing     Problem: Infection Progression (Sepsis/Septic Shock)  Goal: Absence of Infection Signs and Symptoms  Outcome: Ongoing, Progressing  Intervention: Initiate Sepsis Management  Recent Flowsheet Documentation  Taken 7/23/2024 2000 by Annie Castelan RN  Infection Prevention: single patient room provided  Intervention: Promote Recovery  Recent Flowsheet Documentation  Taken 7/23/2024 2000 by Annie Castelan RN  Sleep/Rest Enhancement: awakenings minimized     Problem: Nutrition Impaired (Sepsis/Septic Shock)  Goal: Optimal Nutrition Intake  Outcome: Ongoing, Progressing     Problem: Adult Inpatient Plan of Care  Goal: Plan of Care Review  Outcome: Ongoing, Progressing  Flowsheets (Taken 7/24/2024 0242)  Plan of Care Reviewed With: patient  Goal: Patient-Specific Goal (Individualized)  Outcome: Ongoing, Progressing  Goal: Absence of Hospital-Acquired Illness or Injury  Outcome: Ongoing, Progressing  Intervention: Identify and Manage Fall Risk  Recent Flowsheet Documentation  Taken 7/24/2024 0143 by Annie Castelan RN  Safety Promotion/Fall Prevention:   activity supervised   assistive device/personal items within reach   clutter free environment maintained   safety  round/check completed  Taken 7/24/2024 0000 by Annie Castelan RN  Safety Promotion/Fall Prevention:   activity supervised   assistive device/personal items within reach   clutter free environment maintained   safety round/check completed  Taken 7/23/2024 2200 by Annie Castelan RN  Safety Promotion/Fall Prevention:   activity supervised   assistive device/personal items within reach   clutter free environment maintained   safety round/check completed  Taken 7/23/2024 2000 by Annie Castelan RN  Safety Promotion/Fall Prevention: safety round/check completed  Intervention: Prevent Skin Injury  Recent Flowsheet Documentation  Taken 7/24/2024 0143 by Annie Castelan RN  Body Position: position changed independently  Taken 7/24/2024 0000 by Annie Castelan RN  Body Position: position changed independently  Taken 7/23/2024 2200 by Annie Castelan RN  Body Position: position changed independently  Taken 7/23/2024 2000 by Annie Castelan RN  Body Position: position changed independently  Skin Protection: adhesive use limited  Intervention: Prevent and Manage VTE (Venous Thromboembolism) Risk  Recent Flowsheet Documentation  Taken 7/23/2024 2000 by Annie Castelan RN  VTE Prevention/Management:   bilateral   sequential compression devices on  Range of Motion: active ROM (range of motion) encouraged  Intervention: Prevent Infection  Recent Flowsheet Documentation  Taken 7/23/2024 2000 by Annie Castelan RN  Infection Prevention: single patient room provided  Goal: Optimal Comfort and Wellbeing  Outcome: Ongoing, Progressing  Intervention: Provide Person-Centered Care  Recent Flowsheet Documentation  Taken 7/23/2024 2000 by Annie Castelan RN  Trust Relationship/Rapport:   choices provided   care explained  Goal: Readiness for Transition of Care  Outcome: Ongoing, Progressing  Intervention: Mutually Develop Transition Plan  Recent Flowsheet Documentation  Taken 7/23/2024 2217 by Annie Castelan RN  Equipment Currently Used at  Home: none     Problem: Communication Impairment (Mechanical Ventilation, Invasive)  Goal: Effective Communication  Outcome: Ongoing, Progressing  Intervention: Ensure Effective Communication  Recent Flowsheet Documentation  Taken 7/23/2024 2000 by Annie Castelan RN  Communication Enhancement Strategies: verbal communication attempts encouraged     Problem: Device-Related Complication Risk (Mechanical Ventilation, Invasive)  Goal: Optimal Device Function  Outcome: Ongoing, Progressing  Intervention: Optimize Device Care and Function  Recent Flowsheet Documentation  Taken 7/23/2024 2000 by Annie Castelan RN  Airway Safety Measures: oxygen flowmeter at bedside     Problem: Inability to Wean (Mechanical Ventilation, Invasive)  Goal: Mechanical Ventilation Liberation  Outcome: Ongoing, Progressing  Intervention: Promote Extubation and Mechanical Ventilation Liberation  Recent Flowsheet Documentation  Taken 7/24/2024 0143 by Annie Castelan RN  Medication Review/Management: medications reviewed  Taken 7/24/2024 0000 by Annie Castelan RN  Medication Review/Management: medications reviewed  Taken 7/23/2024 2200 by Annie Castelan RN  Medication Review/Management: medications reviewed  Taken 7/23/2024 2000 by Annie Castelan RN  Environmental Support: calm environment promoted  Sleep/Rest Enhancement: awakenings minimized  Medication Review/Management: medications reviewed     Problem: Nutrition Impairment (Mechanical Ventilation, Invasive)  Goal: Optimal Nutrition Delivery  Outcome: Ongoing, Progressing     Problem: Skin and Tissue Injury (Mechanical Ventilation, Invasive)  Goal: Absence of Device-Related Skin and Tissue Injury  Outcome: Ongoing, Progressing  Intervention: Maintain Skin and Tissue Health  Recent Flowsheet Documentation  Taken 7/23/2024 2000 by Annie Castelan RN  Device Skin Pressure Protection: absorbent pad utilized/changed     Problem: Ventilator-Induced Lung Injury (Mechanical Ventilation,  Invasive)  Goal: Absence of Ventilator-Induced Lung Injury  Outcome: Ongoing, Progressing  Intervention: Prevent Ventilator-Associated Pneumonia  Recent Flowsheet Documentation  Taken 7/24/2024 0143 by Annie Castelan RN  Head of Bed (HOB) Positioning: HOB at 30 degrees  Taken 7/24/2024 0000 by Annie Castelan RN  Head of Bed (HOB) Positioning: HOB at 30 degrees  Taken 7/23/2024 2200 by Annie Castelan RN  Head of Bed (HOB) Positioning: HOB at 30 degrees  Taken 7/23/2024 2000 by Annie Castelan RN  Head of Bed (HOB) Positioning: HOB at 20 degrees     Problem: Restraint, Nonviolent  Goal: Absence of Harm or Injury  Outcome: Ongoing, Progressing  Intervention: Implement Least Restrictive Safety Strategies  Recent Flowsheet Documentation  Taken 7/23/2024 2000 by Annie Castelan RN  Medical Device Protection: IV pole/bag removed from visual field  Diversional Activities: television  Intervention: Protect Dignity, Rights, and Personal Wellbeing  Recent Flowsheet Documentation  Taken 7/23/2024 2000 by Annie Castelan RN  Trust Relationship/Rapport:   choices provided   care explained  Intervention: Protect Skin and Joint Integrity  Recent Flowsheet Documentation  Taken 7/24/2024 0143 by Annie Castelan RN  Body Position: position changed independently  Taken 7/24/2024 0000 by Annie Castelan RN  Body Position: position changed independently  Taken 7/23/2024 2200 by Annie Castelan RN  Body Position: position changed independently  Taken 7/23/2024 2000 by Annie Castelan RN  Body Position: position changed independently  Range of Motion: active ROM (range of motion) encouraged     Problem: Hypertension Comorbidity  Goal: Blood Pressure in Desired Range  Outcome: Ongoing, Progressing  Intervention: Maintain Blood Pressure Management  Recent Flowsheet Documentation  Taken 7/24/2024 0143 by Annie Castelan RN  Medication Review/Management: medications reviewed  Taken 7/24/2024 0000 by Annie Castelan RN  Medication  Review/Management: medications reviewed  Taken 7/23/2024 2200 by Annie Castelan, RN  Medication Review/Management: medications reviewed  Taken 7/23/2024 2000 by Annie Castelan, RN  Medication Review/Management: medications reviewed   Goal Outcome Evaluation:  Plan of Care Reviewed With: patient

## 2024-07-25 VITALS
OXYGEN SATURATION: 99 % | TEMPERATURE: 98.6 F | BODY MASS INDEX: 25.93 KG/M2 | RESPIRATION RATE: 16 BRPM | HEIGHT: 65 IN | WEIGHT: 155.65 LBS | HEART RATE: 55 BPM | DIASTOLIC BLOOD PRESSURE: 46 MMHG | SYSTOLIC BLOOD PRESSURE: 93 MMHG

## 2024-07-25 LAB
BACTERIA SPEC RESP CULT: ABNORMAL
BACTERIA SPEC RESP CULT: ABNORMAL
GRAM STN SPEC: ABNORMAL

## 2024-07-25 PROCEDURE — 25010000002 THIAMINE HCL 200 MG/2ML SOLUTION: Performed by: INTERNAL MEDICINE

## 2024-07-25 PROCEDURE — 25010000002 CEFTRIAXONE PER 250 MG: Performed by: INTERNAL MEDICINE

## 2024-07-25 RX ADMIN — SENNOSIDES AND DOCUSATE SODIUM 2 TABLET: 50; 8.6 TABLET ORAL at 09:50

## 2024-07-25 RX ADMIN — FOLIC ACID 1 MG: 1 TABLET ORAL at 09:50

## 2024-07-25 RX ADMIN — CEFTRIAXONE 2000 MG: 2 INJECTION, POWDER, FOR SOLUTION INTRAMUSCULAR; INTRAVENOUS at 00:08

## 2024-07-25 RX ADMIN — THIAMINE HYDROCHLORIDE 200 MG: 100 INJECTION, SOLUTION INTRAMUSCULAR; INTRAVENOUS at 06:26

## 2024-07-25 NOTE — PLAN OF CARE
Goal Outcome Evaluation:  Plan of Care Reviewed With: patient        Progress: improving  Outcome Evaluation: Discharged Home

## 2024-07-25 NOTE — CASE MANAGEMENT/SOCIAL WORK
Continued Stay Note  [facility]     Patient Name: Edin Alarcon  MRN: [id number]  Today's Date: 7/25/2024    Admit Date: 7/21/2024    Plan: Home with family   Discharge Plan       Row Name 07/25/24 1612       Plan    Plan Home with family    Patient/Family in Agreement with Plan yes    Plan Comments Met with patient and family at bedside. Patient had questions regarding billing and hospital stay. Patient's newphew translated because patient is [place]ian speaking. Explained to him that [employer] screened him. He is not eligible for Medicaid because he isn't a [place] citizen or a permanent resident and he is over income. Family was provided financial assistance paperwork to complete. No additiional needs noted.....Sloane Matias RN, CCP                   Discharge Codes    No documentation.                 Expected Discharge Date and Time       Expected Discharge Date Expected Discharge Time    Jul 25, 2024               Sloane Matias, RN

## 2024-07-25 NOTE — DISCHARGE SUMMARY
PHYSICIAN DISCHARGE SUMMARY                                                                        [facility]    Patient Identification:  Patient Name:  Edin Alarcon  MRN:  [id number]   YOB: 1990  Age: 33 y.o.  Sex: male  Primary Care Physician: Provider, No Known    Admit date: 7/21/2024  Discharge date and time: No discharge date for patient encounter.   Discharged Condition: fair    Discharge Diagnoses:  Altered mental state    Altered mental status       Hospital Course: Edin Alarcon presented to [facility] after being found unresponsive and brought to the hospital.  Patient was intubated for airway protection and found to have UDS positive for cocaine, fentanyl, benzodiazepines, significant blood alcohol level.  Mental status improved and patient was successfully extubated.  Treated with [facility] protocol for alcohol, does not consistently drink however binges.  Treated for pneumonia with ceftriaxone and completed course of antibiotics.  Discharged home in stable condition after evaluation by psychiatry and neurology.  Instructed to follow-up with his primary care physician after discharge.    Consults:   IP CONSULT TO PULMONOLOGY  IP CONSULT TO NEUROLOGY  IP CONSULT TO ACCESS CENTER    Significant Diagnostic Studies:   Results from last 7 days   Lab Units 07/24/24  0550 07/23/24  1337 07/23/24  0402 07/22/24  0831 07/21/24  0743   WBC 10*3/mm3 13.15* 14.10* 13.84* 21.70* 16.96*   HEMOGLOBIN g/dL 13.3 13.9 9.8* 13.4 14.8   PLATELETS 10*3/mm3 273 240 156 223 306     Results from last 7 days   Lab Units 07/23/24  0736 07/22/24  1727 07/22/24  0425 07/21/24  0743   SODIUM mmol/L 137  --  134* 141   POTASSIUM mmol/L 4.4 3.6 3.4* 3.4*   CHLORIDE mmol/L 107  --  102 103   CO2 mmol/L 21.0*  --  17.6* 13.7*   BUN mg/dL 8  --  7 8   CREATININE mg/dL 0.69*  --  0.65* 1.09   GLUCOSE  mg/dL 107*  --  263* 378*   CALCIUM mg/dL 9.0  --  7.6* 8.0*   MAGNESIUM mg/dL 2.4  --  1.5* 2.4*   PHOSPHORUS mg/dL 2.6 1.6* 2.0*  --    Estimated Creatinine Clearance: 152.1 mL/min (A) (by C-G formula based on SCr of 0.69 mg/dL (L)).    Results from last 7 days   Lab Units 07/24/24  0550 07/23/24  1337 07/23/24  0402 07/22/24  0831 07/22/24  0425 07/22/24  0120 07/21/24  1947 07/21/24  0843 07/21/24  0743   AST (SGOT) U/L  --   --   --   --   --   --   --   --  46*   ALT (SGPT) U/L  --   --   --   --   --   --   --   --  45*   PROCALCITONIN ng/mL  --   --   --   --  0.69*  --   --   --   --    LACTATE mmol/L  --   --   --  2.3*  --  3.8* 3.1*   < >  --    PLATELETS 10*3/mm3 273 240 156 223  --   --   --   --  306    < > = values in this interval not displayed.       Results from last 7 days   Lab Units 07/22/24  1448 07/21/24  1202 07/21/24  0843   PH, ARTERIAL pH units 7.438 7.254* 7.097*   PCO2, ARTERIAL mm Hg 37.6 43.1 56.8*   PO2 ART mm Hg 72.6* 355.1* 300.2*   HCO3 ART mmol/L 25.4 19.1* 17.5*         Discharge Exam:  General: Alert, nontoxic, NAD  HEENT: NC/AT, EOMI, MMM  Neck: Supple, trachea midline  Cardiac: RRR, no murmur, gallops, rubs  Pulmonary: Clear to auscultation bilaterally, no adventitious breath sounds, normal respiratory effort  GI: Soft, non-tender, non-distended, normal bowel sounds  Extremities: Warm, well perfused, no LE edema  Skin: no visible rash  Neuro: CN II - XII grossly intact  Psychiatry: Normal mood and affect     Disposition:  Home    Patient Instructions:      Discharge Medications      Patient Not Prescribed Medications Upon Discharge        Follow-up Information       Provider, No Known .    Contact information:  [facility] [id number] [phone]                              Medication Reconciliation: Please see electronically completed Med Rec.    Total time spent discharging patient including evaluation, medication reconciliation, arranging follow up,  and post hospitalization instructions and education total time exceeds 30 minutes.    Signed:  Chato De Jesus MD  7/25/2024  12:30 EDT

## 2024-07-25 NOTE — PLAN OF CARE
Problem: Fall Injury Risk  Goal: Absence of Fall and Fall-Related Injury  Outcome: Ongoing, Progressing  Intervention: Identify and Manage Contributors  Recent Flowsheet Documentation  Taken 7/25/2024 0400 by Annie Castelan RN  Medication Review/Management: medications reviewed  Taken 7/25/2024 0200 by Annie Castelan RN  Medication Review/Management: medications reviewed  Taken 7/25/2024 0019 by Annie Castelan RN  Medication Review/Management: medications reviewed  Taken 7/24/2024 2200 by Annie Castelan RN  Medication Review/Management: medications reviewed  Taken 7/24/2024 1956 by Annie Castelan RN  Medication Review/Management: medications reviewed  Intervention: Promote Injury-Free Environment  Recent Flowsheet Documentation  Taken 7/25/2024 0400 by Annie Castelan RN  Safety Promotion/Fall Prevention:   activity supervised   assistive device/personal items within reach   clutter free environment maintained   safety round/check completed  Taken 7/25/2024 0200 by Annie Castelan RN  Safety Promotion/Fall Prevention:   activity supervised   assistive device/personal items within reach   clutter free environment maintained   safety round/check completed  Taken 7/25/2024 0019 by Annie Castelan RN  Safety Promotion/Fall Prevention:   activity supervised   assistive device/personal items within reach   clutter free environment maintained   safety round/check completed  Taken 7/24/2024 2200 by Annie Castelan RN  Safety Promotion/Fall Prevention:   activity supervised   assistive device/personal items within reach   clutter free environment maintained   safety round/check completed  Taken 7/24/2024 1956 by Annie Castelan RN  Safety Promotion/Fall Prevention: safety round/check completed     Problem: Skin Injury Risk Increased  Goal: Skin Health and Integrity  Outcome: Ongoing, Progressing  Intervention: Optimize Skin Protection  Recent Flowsheet Documentation  Taken 7/25/2024 0400 by Annie Castelan RN  Head of  Bed (HOB) Positioning: HOB at 30 degrees  Taken 7/25/2024 0200 by Annie Castelan RN  Head of Bed (HOB) Positioning: HOB at 30 degrees  Taken 7/25/2024 0019 by Annie Castelan RN  Head of Bed (HOB) Positioning: HOB at 30 degrees  Taken 7/24/2024 2200 by Annie Castelan RN  Head of Bed (HOB) Positioning: HOB at 30 degrees  Taken 7/24/2024 1956 by Annie Castelan RN  Pressure Reduction Techniques: frequent weight shift encouraged  Head of Bed (HOB) Positioning: HOB at 20 degrees  Pressure Reduction Devices: positioning supports utilized  Skin Protection: adhesive use limited     Problem: Adjustment to Illness (Sepsis/Septic Shock)  Goal: Optimal Coping  Outcome: Ongoing, Progressing  Intervention: Optimize Psychosocial Adjustment to Illness  Recent Flowsheet Documentation  Taken 7/24/2024 1956 by Annie Castelan RN  Family/Support System Care: self-care encouraged     Problem: Bleeding (Sepsis/Septic Shock)  Goal: Absence of Bleeding  Outcome: Ongoing, Progressing     Problem: Glycemic Control Impaired (Sepsis/Septic Shock)  Goal: Blood Glucose Level Within Desired Range  Outcome: Ongoing, Progressing     Problem: Infection Progression (Sepsis/Septic Shock)  Goal: Absence of Infection Signs and Symptoms  Outcome: Ongoing, Progressing  Intervention: Initiate Sepsis Management  Recent Flowsheet Documentation  Taken 7/24/2024 1956 by Annie Castelan RN  Infection Prevention: single patient room provided     Problem: Nutrition Impaired (Sepsis/Septic Shock)  Goal: Optimal Nutrition Intake  Outcome: Ongoing, Progressing     Problem: Adult Inpatient Plan of Care  Goal: Plan of Care Review  Outcome: Ongoing, Progressing  Flowsheets (Taken 7/25/2024 0421)  Plan of Care Reviewed With: patient  Goal: Patient-Specific Goal (Individualized)  Outcome: Ongoing, Progressing  Goal: Absence of Hospital-Acquired Illness or Injury  Outcome: Ongoing, Progressing  Intervention: Identify and Manage Fall Risk  Recent Flowsheet  Documentation  Taken 7/25/2024 0400 by Annie Castelan RN  Safety Promotion/Fall Prevention:   activity supervised   assistive device/personal items within reach   clutter free environment maintained   safety round/check completed  Taken 7/25/2024 0200 by Annie Castelan RN  Safety Promotion/Fall Prevention:   activity supervised   assistive device/personal items within reach   clutter free environment maintained   safety round/check completed  Taken 7/25/2024 0019 by Annie Castelan RN  Safety Promotion/Fall Prevention:   activity supervised   assistive device/personal items within reach   clutter free environment maintained   safety round/check completed  Taken 7/24/2024 2200 by Annie Castelan RN  Safety Promotion/Fall Prevention:   activity supervised   assistive device/personal items within reach   clutter free environment maintained   safety round/check completed  Taken 7/24/2024 1956 by Annie Castelan RN  Safety Promotion/Fall Prevention: safety round/check completed  Intervention: Prevent Skin Injury  Recent Flowsheet Documentation  Taken 7/25/2024 0400 by Annie Castelan RN  Body Position: position changed independently  Taken 7/25/2024 0200 by Annie Castelan RN  Body Position: position changed independently  Taken 7/25/2024 0019 by Annie Castelan RN  Body Position: position changed independently  Taken 7/24/2024 2200 by Annie Castelan RN  Body Position: position changed independently  Taken 7/24/2024 1956 by Annie Castelan RN  Body Position: position changed independently  Skin Protection: adhesive use limited  Intervention: Prevent Infection  Recent Flowsheet Documentation  Taken 7/24/2024 1956 by Annie Castelan RN  Infection Prevention: single patient room provided  Goal: Optimal Comfort and Wellbeing  Outcome: Ongoing, Progressing  Intervention: Provide Person-Centered Care  Recent Flowsheet Documentation  Taken 7/24/2024 1956 by Annie Castelan RN  Trust Relationship/Rapport:   care explained    choices provided  Goal: Readiness for Transition of Care  Outcome: Ongoing, Progressing     Problem: Communication Impairment (Mechanical Ventilation, Invasive)  Goal: Effective Communication  Outcome: Ongoing, Progressing     Problem: Device-Related Complication Risk (Mechanical Ventilation, Invasive)  Goal: Optimal Device Function  Outcome: Ongoing, Progressing  Intervention: Optimize Device Care and Function  Recent Flowsheet Documentation  Taken 7/24/2024 1956 by Annie Castelan RN  Airway Safety Measures: oxygen flowmeter at bedside     Problem: Inability to Wean (Mechanical Ventilation, Invasive)  Goal: Mechanical Ventilation Liberation  Outcome: Ongoing, Progressing  Intervention: Promote Extubation and Mechanical Ventilation Liberation  Recent Flowsheet Documentation  Taken 7/25/2024 0400 by Annie Castelan RN  Medication Review/Management: medications reviewed  Taken 7/25/2024 0200 by Annie Castelan RN  Medication Review/Management: medications reviewed  Taken 7/25/2024 0019 by Annie Castelan RN  Medication Review/Management: medications reviewed  Taken 7/24/2024 2200 by Annie Castelan RN  Medication Review/Management: medications reviewed  Taken 7/24/2024 1956 by Annie Castelan RN  Medication Review/Management: medications reviewed     Problem: Nutrition Impairment (Mechanical Ventilation, Invasive)  Goal: Optimal Nutrition Delivery  Outcome: Ongoing, Progressing     Problem: Skin and Tissue Injury (Mechanical Ventilation, Invasive)  Goal: Absence of Device-Related Skin and Tissue Injury  Outcome: Ongoing, Progressing     Problem: Ventilator-Induced Lung Injury (Mechanical Ventilation, Invasive)  Goal: Absence of Ventilator-Induced Lung Injury  Outcome: Ongoing, Progressing  Intervention: Prevent Ventilator-Associated Pneumonia  Recent Flowsheet Documentation  Taken 7/25/2024 0400 by Annie Castelan RN  Head of Bed (HOB) Positioning: HOB at 30 degrees  Taken 7/25/2024 0200 by Annie Castelan RN  Head  of Bed (HOB) Positioning: HOB at 30 degrees  Taken 7/25/2024 0019 by Annie Castelan RN  Head of Bed (HOB) Positioning: HOB at 30 degrees  Taken 7/24/2024 2200 by Annie Castelan RN  Head of Bed (HOB) Positioning: HOB at 30 degrees  Taken 7/24/2024 1956 by Annie Castelan RN  Head of Bed (HOB) Positioning: HOB at 20 degrees     Problem: Restraint, Nonviolent  Goal: Absence of Harm or Injury  Outcome: Ongoing, Progressing  Intervention: Implement Least Restrictive Safety Strategies  Recent Flowsheet Documentation  Taken 7/24/2024 1956 by Annie Castelan RN  Medical Device Protection: IV pole/bag removed from visual field  Diversional Activities: television  Intervention: Protect Dignity, Rights, and Personal Wellbeing  Recent Flowsheet Documentation  Taken 7/24/2024 1956 by Annie Castelan RN  Trust Relationship/Rapport:   care explained   choices provided  Intervention: Protect Skin and Joint Integrity  Recent Flowsheet Documentation  Taken 7/25/2024 0400 by Annie Castelan RN  Body Position: position changed independently  Taken 7/25/2024 0200 by Annie Castelan RN  Body Position: position changed independently  Taken 7/25/2024 0019 by Annie Castelan RN  Body Position: position changed independently  Taken 7/24/2024 2200 by Annie Castelan RN  Body Position: position changed independently  Taken 7/24/2024 1956 by Annie Castelan RN  Body Position: position changed independently     Problem: Hypertension Comorbidity  Goal: Blood Pressure in Desired Range  Outcome: Ongoing, Progressing  Intervention: Maintain Blood Pressure Management  Recent Flowsheet Documentation  Taken 7/25/2024 0400 by Annie Castelan RN  Medication Review/Management: medications reviewed  Taken 7/25/2024 0200 by Annie Castelan RN  Medication Review/Management: medications reviewed  Taken 7/25/2024 0019 by Annie Castelan RN  Medication Review/Management: medications reviewed  Taken 7/24/2024 2200 by Annie Castelan RN  Medication  Review/Management: medications reviewed  Taken 7/24/2024 1956 by Annie Castelan, RN  Medication Review/Management: medications reviewed   Goal Outcome Evaluation:  Plan of Care Reviewed With: patient

## 2024-07-25 NOTE — CASE MANAGEMENT/SOCIAL WORK
Case Management Discharge Note      Final Note: Home with family---no needs per private auto         Selected Continued Care - Discharged on 7/25/2024 Admission date: 7/21/2024 - Discharge disposition: Home or Self Care      Destination    No services have been selected for the patient.                Durable Medical Equipment    No services have been selected for the patient.                Dialysis/Infusion    No services have been selected for the patient.                Home Medical Care    No services have been selected for the patient.                Therapy    No services have been selected for the patient.                Community Resources    No services have been selected for the patient.                Community & DME    No services have been selected for the patient.                         Final Discharge Disposition Code: 01 - home or self-care

## 2024-07-25 NOTE — DISCHARGE INSTRUCTIONS
You were treated for altered mental status which ultimately led to you having to be intubated (breathing tube) and put on a ventilator.  This was due to drug overdose.  You were also treated for a bacterial pneumonia with IV antibiotics for which she completed a course.    You are to refrain from any drugs and alcohol as a combination of these can cause significant changes in your mental status similar to what happened.  Worst-case scenario you can have complications including respiratory failure and up to death.    You are to follow-up with your primary care physician after discharge.

## 2024-07-26 ENCOUNTER — READMISSION MANAGEMENT (OUTPATIENT)
Dept: CALL CENTER | Facility: HOSPITAL | Age: 34
End: 2024-07-26

## 2024-07-26 LAB
BACTERIA SPEC AEROBE CULT: NORMAL
BACTERIA SPEC AEROBE CULT: NORMAL

## 2024-07-26 NOTE — OUTREACH NOTE
Prep Survey      Flowsheet Row Responses   [facility] facility patient discharged from? [facility]   Is LACE score < 7 ? No   Eligibility Readm Mgmt   Discharge diagnosis Altered mental state, pneumonia   Does the patient have one of the following disease processes/diagnoses(primary or secondary)? Pneumonia   Does the patient have Home health ordered? No   Is there a DME ordered? No   Medication alerts for this patient see avs   General alerts for this patient [place]ic speaking   Prep survey completed? Yes            Laura CANTOR - Registered Nurse

## 2024-07-28 ENCOUNTER — APPOINTMENT (OUTPATIENT)
Dept: GENERAL RADIOLOGY | Facility: HOSPITAL | Age: 34
End: 2024-07-28

## 2024-07-28 ENCOUNTER — HOSPITAL ENCOUNTER (OUTPATIENT)
Facility: HOSPITAL | Age: 34
Setting detail: OBSERVATION
Discharge: HOME | End: 2024-07-31
Attending: EMERGENCY MEDICINE | Admitting: INTERNAL MEDICINE

## 2024-07-28 DIAGNOSIS — R03.0 ELEVATED BP WITHOUT DIAGNOSIS OF HYPERTENSION: ICD-10-CM

## 2024-07-28 DIAGNOSIS — R06.2 WHEEZING: ICD-10-CM

## 2024-07-28 DIAGNOSIS — E87.20 LACTIC ACIDOSIS: Primary | ICD-10-CM

## 2024-07-28 LAB
BASOPHILS # BLD AUTO: 0.06 10*3/MM3 (ref 0–0.2)
BASOPHILS NFR BLD AUTO: 0.5 % (ref 0–1.5)
DEPRECATED RDW RBC AUTO: 43 FL (ref 37–54)
EOSINOPHIL # BLD AUTO: 0.27 10*3/MM3 (ref 0–0.4)
EOSINOPHIL NFR BLD AUTO: 2.1 % (ref 0.3–6.2)
ERYTHROCYTE [DISTWIDTH] IN BLOOD BY AUTOMATED COUNT: 12.9 % (ref 12.3–15.4)
HCT VFR BLD AUTO: 43.6 % (ref 37.5–51)
HGB BLD-MCNC: 14.4 G/DL (ref 13–17.7)
IMM GRANULOCYTES # BLD AUTO: 0.2 10*3/MM3 (ref 0–0.05)
IMM GRANULOCYTES NFR BLD AUTO: 1.5 % (ref 0–0.5)
LYMPHOCYTES # BLD AUTO: 4.67 10*3/MM3 (ref 0.7–3.1)
LYMPHOCYTES NFR BLD AUTO: 36 % (ref 19.6–45.3)
MCH RBC QN AUTO: 30.3 PG (ref 26.6–33)
MCHC RBC AUTO-ENTMCNC: 33 G/DL (ref 31.5–35.7)
MCV RBC AUTO: 91.6 FL (ref 79–97)
MONOCYTES # BLD AUTO: 0.65 10*3/MM3 (ref 0.1–0.9)
MONOCYTES NFR BLD AUTO: 5 % (ref 5–12)
NEUTROPHILS NFR BLD AUTO: 54.9 % (ref 42.7–76)
NEUTROPHILS NFR BLD AUTO: 7.14 10*3/MM3 (ref 1.7–7)
NRBC BLD AUTO-RTO: 0 /100 WBC (ref 0–0.2)
PLATELET # BLD AUTO: 431 10*3/MM3 (ref 140–450)
PMV BLD AUTO: 9.4 FL (ref 6–12)
RBC # BLD AUTO: 4.76 10*6/MM3 (ref 4.14–5.8)
WBC NRBC COR # BLD AUTO: 12.99 10*3/MM3 (ref 3.4–10.8)

## 2024-07-28 PROCEDURE — 80053 COMPREHEN METABOLIC PANEL: CPT | Performed by: EMERGENCY MEDICINE

## 2024-07-28 PROCEDURE — 84484 ASSAY OF TROPONIN QUANT: CPT | Performed by: EMERGENCY MEDICINE

## 2024-07-28 PROCEDURE — 93005 ELECTROCARDIOGRAM TRACING: CPT | Performed by: EMERGENCY MEDICINE

## 2024-07-28 PROCEDURE — 93005 ELECTROCARDIOGRAM TRACING: CPT

## 2024-07-28 PROCEDURE — 93010 ELECTROCARDIOGRAM REPORT: CPT | Performed by: INTERNAL MEDICINE

## 2024-07-28 PROCEDURE — 83880 ASSAY OF NATRIURETIC PEPTIDE: CPT | Performed by: EMERGENCY MEDICINE

## 2024-07-28 PROCEDURE — 99285 EMERGENCY DEPT VISIT HI MDM: CPT

## 2024-07-28 PROCEDURE — 71045 X-RAY EXAM CHEST 1 VIEW: CPT

## 2024-07-28 PROCEDURE — 82077 ASSAY SPEC XCP UR&BREATH IA: CPT | Performed by: PHYSICIAN ASSISTANT

## 2024-07-28 PROCEDURE — 85025 COMPLETE CBC W/AUTO DIFF WBC: CPT | Performed by: EMERGENCY MEDICINE

## 2024-07-28 PROCEDURE — 84145 PROCALCITONIN (PCT): CPT | Performed by: PHYSICIAN ASSISTANT

## 2024-07-28 RX ORDER — SODIUM CHLORIDE 0.9 % (FLUSH) 0.9 %
10 SYRINGE (ML) INJECTION AS NEEDED
Status: DISCONTINUED | OUTPATIENT
Start: 2024-07-28 | End: 2024-07-31 | Stop reason: HOSPADM

## 2024-07-28 NOTE — Clinical Note
Level of Care: Telemetry [5]   Diagnosis: Dyspnea [[id number]]   Admitting Physician: HAKAN PAULSON [[id number]]

## 2024-07-29 ENCOUNTER — READMISSION MANAGEMENT (OUTPATIENT)
Dept: CALL CENTER | Facility: HOSPITAL | Age: 34
End: 2024-07-29

## 2024-07-29 ENCOUNTER — APPOINTMENT (OUTPATIENT)
Dept: CT IMAGING | Facility: HOSPITAL | Age: 34
End: 2024-07-29

## 2024-07-29 PROBLEM — Z87.898 HISTORY OF ALCOHOL USE: Status: ACTIVE | Noted: 2024-07-29

## 2024-07-29 PROBLEM — R73.9 HYPERGLYCEMIA: Status: ACTIVE | Noted: 2024-07-29

## 2024-07-29 PROBLEM — R06.00 DYSPNEA: Status: ACTIVE | Noted: 2024-07-29

## 2024-07-29 PROBLEM — J22 LOWER RESPIRATORY TRACT INFECTION: Status: ACTIVE | Noted: 2024-07-29

## 2024-07-29 LAB
ALBUMIN SERPL-MCNC: 4.4 G/DL (ref 3.5–5.2)
ALBUMIN/GLOB SERPL: 1.2 G/DL
ALP SERPL-CCNC: 98 U/L (ref 39–117)
ALT SERPL W P-5'-P-CCNC: 38 U/L (ref 1–41)
AMPHET+METHAMPHET UR QL: NEGATIVE
ANION GAP SERPL CALCULATED.3IONS-SCNC: 12 MMOL/L (ref 5–15)
AST SERPL-CCNC: 27 U/L (ref 1–40)
B PARAPERT DNA SPEC QL NAA+PROBE: NOT DETECTED
B PERT DNA SPEC QL NAA+PROBE: NOT DETECTED
BACTERIA SPEC RESP CULT: NORMAL
BARBITURATES UR QL SCN: NEGATIVE
BENZODIAZ UR QL SCN: NEGATIVE
BILIRUB SERPL-MCNC: 0.2 MG/DL (ref 0–1.2)
BUN SERPL-MCNC: 8 MG/DL (ref 6–20)
BUN/CREAT SERPL: 12.3 (ref 7–25)
C PNEUM DNA NPH QL NAA+NON-PROBE: NOT DETECTED
CALCIUM SPEC-SCNC: 9.4 MG/DL (ref 8.6–10.5)
CANNABINOIDS SERPL QL: NEGATIVE
CHLORIDE SERPL-SCNC: 102 MMOL/L (ref 98–107)
CO2 SERPL-SCNC: 24 MMOL/L (ref 22–29)
COCAINE UR QL: NEGATIVE
CREAT SERPL-MCNC: 0.65 MG/DL (ref 0.76–1.27)
D-LACTATE SERPL-SCNC: 2.7 MMOL/L (ref 0.5–2)
D-LACTATE SERPL-SCNC: 2.9 MMOL/L (ref 0.5–2)
D-LACTATE SERPL-SCNC: 3 MMOL/L (ref 0.5–2)
D-LACTATE SERPL-SCNC: 3.9 MMOL/L (ref 0.5–2)
D-LACTATE SERPL-SCNC: 4.3 MMOL/L (ref 0.5–2)
D-LACTATE SERPL-SCNC: 4.6 MMOL/L (ref 0.5–2)
EGFRCR SERPLBLD CKD-EPI 2021: 127.6 ML/MIN/1.73
ETHANOL BLD-MCNC: <10 MG/DL (ref 0–10)
ETHANOL UR QL: <0.01 %
FENTANYL UR-MCNC: NEGATIVE NG/ML
FLUAV SUBTYP SPEC NAA+PROBE: NOT DETECTED
FLUBV RNA ISLT QL NAA+PROBE: NOT DETECTED
GLOBULIN UR ELPH-MCNC: 3.6 GM/DL
GLUCOSE SERPL-MCNC: 161 MG/DL (ref 65–99)
GRAM STN SPEC: NORMAL
HADV DNA SPEC NAA+PROBE: NOT DETECTED
HBA1C MFR BLD: 6 % (ref 4.8–5.6)
HCOV 229E RNA SPEC QL NAA+PROBE: NOT DETECTED
HCOV HKU1 RNA SPEC QL NAA+PROBE: NOT DETECTED
HCOV NL63 RNA SPEC QL NAA+PROBE: NOT DETECTED
HCOV OC43 RNA SPEC QL NAA+PROBE: NOT DETECTED
HMPV RNA NPH QL NAA+NON-PROBE: NOT DETECTED
HOLD SPECIMEN: NORMAL
HOLD SPECIMEN: NORMAL
HPIV1 RNA ISLT QL NAA+PROBE: NOT DETECTED
HPIV2 RNA SPEC QL NAA+PROBE: NOT DETECTED
HPIV3 RNA NPH QL NAA+PROBE: NOT DETECTED
HPIV4 P GENE NPH QL NAA+PROBE: NOT DETECTED
L PNEUMO1 AG UR QL IA: NEGATIVE
M PNEUMO IGG SER IA-ACNC: NOT DETECTED
METHADONE UR QL SCN: NEGATIVE
NT-PROBNP SERPL-MCNC: <36 PG/ML (ref 0–450)
OPIATES UR QL: NEGATIVE
OXYCODONE UR QL SCN: NEGATIVE
POTASSIUM SERPL-SCNC: 3.8 MMOL/L (ref 3.5–5.2)
PROCALCITONIN SERPL-MCNC: 0.06 NG/ML (ref 0–0.25)
PROT SERPL-MCNC: 8 G/DL (ref 6–8.5)
QT INTERVAL: 341 MS
QTC INTERVAL: 422 MS
RHINOVIRUS RNA SPEC NAA+PROBE: NOT DETECTED
RSV RNA NPH QL NAA+NON-PROBE: NOT DETECTED
S PNEUM AG SPEC QL LA: NEGATIVE
SARS-COV-2 RNA NPH QL NAA+NON-PROBE: NOT DETECTED
SODIUM SERPL-SCNC: 138 MMOL/L (ref 136–145)
TROPONIN T SERPL HS-MCNC: 8 NG/L
WHOLE BLOOD HOLD COAG: NORMAL
WHOLE BLOOD HOLD SPECIMEN: NORMAL

## 2024-07-29 PROCEDURE — 80307 DRUG TEST PRSMV CHEM ANLYZR: CPT | Performed by: INTERNAL MEDICINE

## 2024-07-29 PROCEDURE — G0378 HOSPITAL OBSERVATION PER HR: HCPCS

## 2024-07-29 PROCEDURE — 0202U NFCT DS 22 TRGT SARS-COV-2: CPT | Performed by: PHYSICIAN ASSISTANT

## 2024-07-29 PROCEDURE — 71250 CT THORAX DX C-: CPT

## 2024-07-29 PROCEDURE — 96367 TX/PROPH/DG ADDL SEQ IV INF: CPT

## 2024-07-29 PROCEDURE — 94799 UNLISTED PULMONARY SVC/PX: CPT

## 2024-07-29 PROCEDURE — 94761 N-INVAS EAR/PLS OXIMETRY MLT: CPT

## 2024-07-29 PROCEDURE — 83036 HEMOGLOBIN GLYCOSYLATED A1C: CPT | Performed by: INTERNAL MEDICINE

## 2024-07-29 PROCEDURE — 25810000003 SODIUM CHLORIDE 0.9 % SOLUTION: Performed by: INTERNAL MEDICINE

## 2024-07-29 PROCEDURE — 25010000002 CEFTRIAXONE PER 250 MG: Performed by: PHYSICIAN ASSISTANT

## 2024-07-29 PROCEDURE — 96365 THER/PROPH/DIAG IV INF INIT: CPT

## 2024-07-29 PROCEDURE — 36415 COLL VENOUS BLD VENIPUNCTURE: CPT | Performed by: PHYSICIAN ASSISTANT

## 2024-07-29 PROCEDURE — 25810000003 SODIUM CHLORIDE 0.9 % SOLUTION: Performed by: PHYSICIAN ASSISTANT

## 2024-07-29 PROCEDURE — 36415 COLL VENOUS BLD VENIPUNCTURE: CPT

## 2024-07-29 PROCEDURE — 87449 NOS EACH ORGANISM AG IA: CPT | Performed by: INTERNAL MEDICINE

## 2024-07-29 PROCEDURE — 83605 ASSAY OF LACTIC ACID: CPT | Performed by: PHYSICIAN ASSISTANT

## 2024-07-29 PROCEDURE — 25010000002 AZITHROMYCIN PER 500 MG: Performed by: INTERNAL MEDICINE

## 2024-07-29 PROCEDURE — 87040 BLOOD CULTURE FOR BACTERIA: CPT | Performed by: PHYSICIAN ASSISTANT

## 2024-07-29 PROCEDURE — 25010000002 METHYLPREDNISOLONE PER 125 MG: Performed by: PHYSICIAN ASSISTANT

## 2024-07-29 PROCEDURE — 94760 N-INVAS EAR/PLS OXIMETRY 1: CPT

## 2024-07-29 PROCEDURE — 94640 AIRWAY INHALATION TREATMENT: CPT

## 2024-07-29 PROCEDURE — 87205 SMEAR GRAM STAIN: CPT | Performed by: INTERNAL MEDICINE

## 2024-07-29 PROCEDURE — 25810000003 SODIUM CHLORIDE 0.9 % SOLUTION 250 ML FLEX CONT: Performed by: INTERNAL MEDICINE

## 2024-07-29 PROCEDURE — 96375 TX/PRO/DX INJ NEW DRUG ADDON: CPT

## 2024-07-29 RX ORDER — BISACODYL 10 MG
10 SUPPOSITORY, RECTAL RECTAL DAILY PRN
Status: DISCONTINUED | OUTPATIENT
Start: 2024-07-29 | End: 2024-07-31 | Stop reason: HOSPADM

## 2024-07-29 RX ORDER — SODIUM CHLORIDE 0.9 % (FLUSH) 0.9 %
10 SYRINGE (ML) INJECTION EVERY 12 HOURS SCHEDULED
Status: DISCONTINUED | OUTPATIENT
Start: 2024-07-29 | End: 2024-07-31 | Stop reason: HOSPADM

## 2024-07-29 RX ORDER — GUAIFENESIN 600 MG/1
1200 TABLET, EXTENDED RELEASE ORAL EVERY 12 HOURS SCHEDULED
Status: DISCONTINUED | OUTPATIENT
Start: 2024-07-29 | End: 2024-07-31 | Stop reason: HOSPADM

## 2024-07-29 RX ORDER — ALBUTEROL SULFATE 2.5 MG/3ML
2.5 SOLUTION RESPIRATORY (INHALATION)
Status: COMPLETED | OUTPATIENT
Start: 2024-07-29 | End: 2024-07-29

## 2024-07-29 RX ORDER — IPRATROPIUM BROMIDE AND ALBUTEROL SULFATE 2.5; .5 MG/3ML; MG/3ML
3 SOLUTION RESPIRATORY (INHALATION) EVERY 8 HOURS
Status: DISCONTINUED | OUTPATIENT
Start: 2024-07-29 | End: 2024-07-29

## 2024-07-29 RX ORDER — FAMOTIDINE 20 MG/1
40 TABLET, FILM COATED ORAL DAILY
Status: DISCONTINUED | OUTPATIENT
Start: 2024-07-29 | End: 2024-07-31 | Stop reason: HOSPADM

## 2024-07-29 RX ORDER — SODIUM CHLORIDE 0.9 % (FLUSH) 0.9 %
10 SYRINGE (ML) INJECTION AS NEEDED
Status: DISCONTINUED | OUTPATIENT
Start: 2024-07-29 | End: 2024-07-31 | Stop reason: HOSPADM

## 2024-07-29 RX ORDER — ACETAMINOPHEN 650 MG/1
650 SUPPOSITORY RECTAL EVERY 4 HOURS PRN
Status: DISCONTINUED | OUTPATIENT
Start: 2024-07-29 | End: 2024-07-31 | Stop reason: HOSPADM

## 2024-07-29 RX ORDER — SODIUM CHLORIDE 9 MG/ML
40 INJECTION, SOLUTION INTRAVENOUS AS NEEDED
Status: DISCONTINUED | OUTPATIENT
Start: 2024-07-29 | End: 2024-07-31 | Stop reason: HOSPADM

## 2024-07-29 RX ORDER — AMOXICILLIN 250 MG
2 CAPSULE ORAL 2 TIMES DAILY
Status: DISCONTINUED | OUTPATIENT
Start: 2024-07-29 | End: 2024-07-31 | Stop reason: HOSPADM

## 2024-07-29 RX ORDER — IPRATROPIUM BROMIDE AND ALBUTEROL SULFATE 2.5; .5 MG/3ML; MG/3ML
3 SOLUTION RESPIRATORY (INHALATION)
Status: DISCONTINUED | OUTPATIENT
Start: 2024-07-29 | End: 2024-07-31 | Stop reason: HOSPADM

## 2024-07-29 RX ORDER — METHYLPREDNISOLONE SODIUM SUCCINATE 125 MG/2ML
125 INJECTION, POWDER, LYOPHILIZED, FOR SOLUTION INTRAMUSCULAR; INTRAVENOUS ONCE
Status: COMPLETED | OUTPATIENT
Start: 2024-07-29 | End: 2024-07-29

## 2024-07-29 RX ORDER — ONDANSETRON 2 MG/ML
4 INJECTION INTRAMUSCULAR; INTRAVENOUS EVERY 6 HOURS PRN
Status: DISCONTINUED | OUTPATIENT
Start: 2024-07-29 | End: 2024-07-31 | Stop reason: HOSPADM

## 2024-07-29 RX ORDER — ACETAMINOPHEN 160 MG/5ML
650 SOLUTION ORAL EVERY 4 HOURS PRN
Status: DISCONTINUED | OUTPATIENT
Start: 2024-07-29 | End: 2024-07-31 | Stop reason: HOSPADM

## 2024-07-29 RX ORDER — AMOXICILLIN 250 MG/1
250 CAPSULE ORAL 3 TIMES DAILY
COMMUNITY
End: 2024-07-31 | Stop reason: HOSPADM

## 2024-07-29 RX ORDER — POLYETHYLENE GLYCOL 3350 17 G/17G
17 POWDER, FOR SOLUTION ORAL DAILY PRN
Status: DISCONTINUED | OUTPATIENT
Start: 2024-07-29 | End: 2024-07-31 | Stop reason: HOSPADM

## 2024-07-29 RX ORDER — SODIUM CHLORIDE 9 MG/ML
100 INJECTION, SOLUTION INTRAVENOUS CONTINUOUS
Status: DISCONTINUED | OUTPATIENT
Start: 2024-07-29 | End: 2024-07-31

## 2024-07-29 RX ORDER — ACETAMINOPHEN 325 MG/1
650 TABLET ORAL EVERY 4 HOURS PRN
Status: DISCONTINUED | OUTPATIENT
Start: 2024-07-29 | End: 2024-07-31 | Stop reason: HOSPADM

## 2024-07-29 RX ORDER — NITROGLYCERIN 0.4 MG/1
0.4 TABLET SUBLINGUAL
Status: DISCONTINUED | OUTPATIENT
Start: 2024-07-29 | End: 2024-07-31 | Stop reason: HOSPADM

## 2024-07-29 RX ORDER — BISACODYL 5 MG/1
5 TABLET, DELAYED RELEASE ORAL DAILY PRN
Status: DISCONTINUED | OUTPATIENT
Start: 2024-07-29 | End: 2024-07-31 | Stop reason: HOSPADM

## 2024-07-29 RX ORDER — ONDANSETRON 4 MG/1
4 TABLET, ORALLY DISINTEGRATING ORAL EVERY 6 HOURS PRN
Status: DISCONTINUED | OUTPATIENT
Start: 2024-07-29 | End: 2024-07-31 | Stop reason: HOSPADM

## 2024-07-29 RX ADMIN — IPRATROPIUM BROMIDE AND ALBUTEROL SULFATE 3 ML: .5; 3 SOLUTION RESPIRATORY (INHALATION) at 10:52

## 2024-07-29 RX ADMIN — Medication 10 ML: at 21:33

## 2024-07-29 RX ADMIN — ALBUTEROL SULFATE 2.5 MG: 2.5 SOLUTION RESPIRATORY (INHALATION) at 00:30

## 2024-07-29 RX ADMIN — SODIUM CHLORIDE 1000 ML: 9 INJECTION, SOLUTION INTRAVENOUS at 02:58

## 2024-07-29 RX ADMIN — GUAIFENESIN 1200 MG: 600 TABLET, EXTENDED RELEASE ORAL at 21:32

## 2024-07-29 RX ADMIN — FAMOTIDINE 40 MG: 20 TABLET, FILM COATED ORAL at 11:51

## 2024-07-29 RX ADMIN — IPRATROPIUM BROMIDE AND ALBUTEROL SULFATE 3 ML: .5; 3 SOLUTION RESPIRATORY (INHALATION) at 19:10

## 2024-07-29 RX ADMIN — SODIUM CHLORIDE 100 ML/HR: 9 INJECTION, SOLUTION INTRAVENOUS at 12:00

## 2024-07-29 RX ADMIN — METHYLPREDNISOLONE SODIUM SUCCINATE 125 MG: 125 INJECTION INTRAMUSCULAR; INTRAVENOUS at 00:10

## 2024-07-29 RX ADMIN — AZITHROMYCIN MONOHYDRATE 500 MG: 500 INJECTION, POWDER, LYOPHILIZED, FOR SOLUTION INTRAVENOUS at 13:03

## 2024-07-29 RX ADMIN — Medication 10 ML: at 12:01

## 2024-07-29 RX ADMIN — ALBUTEROL SULFATE 2.5 MG: 2.5 SOLUTION RESPIRATORY (INHALATION) at 00:53

## 2024-07-29 RX ADMIN — SODIUM CHLORIDE 1000 ML: 9 INJECTION, SOLUTION INTRAVENOUS at 10:29

## 2024-07-29 RX ADMIN — ALBUTEROL SULFATE 2.5 MG: 2.5 SOLUTION RESPIRATORY (INHALATION) at 00:10

## 2024-07-29 RX ADMIN — GUAIFENESIN 1200 MG: 600 TABLET, EXTENDED RELEASE ORAL at 11:51

## 2024-07-29 RX ADMIN — CEFTRIAXONE 2000 MG: 2 INJECTION, POWDER, FOR SOLUTION INTRAMUSCULAR; INTRAVENOUS at 06:00

## 2024-07-29 NOTE — ED PROVIDER NOTES
EMERGENCY DEPARTMENT ENCOUNTER  Room Number:  07/07  PCP: Provider, No Known  Independent Historians: Patient and Family      HPI:  Chief Complaint: had concerns including Shortness of Breath.     A complete HPI/ROS/PMH/PSH/SH/FH are unobtainable due to: Language barrier    Chronic or social conditions impacting patient care (Social Determinants of Health): None      Context: Edin Alarcon is a 33 y.o. male with no significant medical history who presents to the ED c/o acute cough and shortness of breath.  Patient reports he got out of the shower at approximately 2300 this evening and had acute onset of shortness of breath and wheezing.  He has had associated nonproductive cough.  He denies chest pain.  He denies injury or trauma to the chest.  No reported fever.  On record review, patient recently hospitalized for alcohol intoxication.  He had UDS positive for multiple substances.  He was treated for pneumonia during his hospitalization.  Patient denies any other systemic complaints.      Review of prior external notes (non-ED) -and- Review of prior external test results outside of this encounter:  No prior office or urgent care visits in system.  Reviewed labs collected on 7/23/2024.  CBC with hemoglobin 13.9, BMP with creatinine 0.69.    Prescription drug monitoring program review:     N/A    PAST MEDICAL HISTORY  Active Ambulatory Problems     Diagnosis Date Noted    Altered mental state 07/21/2024    Altered mental status 07/21/2024     Resolved Ambulatory Problems     Diagnosis Date Noted    No Resolved Ambulatory Problems     No Additional Past Medical History         PAST SURGICAL HISTORY  No past surgical history on file.      FAMILY HISTORY  No family history on file.      SOCIAL HISTORY  Social History     Socioeconomic History    Marital status: Single   Tobacco Use    Smoking status: Never    Smokeless tobacco: Never   Vaping Use    Vaping status: Never Used   Substance and Sexual Activity     Alcohol use: Yes    Drug use: Yes     Types: Cocaine(coke), Fentanyl, Benzodiazepines    Sexual activity: Defer     Partners: Female         ALLERGIES  Patient has no known allergies.      REVIEW OF SYSTEMS  Review of Systems   Constitutional:  Negative for chills and fever.   HENT:  Negative for ear pain and sore throat.    Respiratory:  Positive for cough, shortness of breath and wheezing.    Cardiovascular:  Negative for chest pain and palpitations.   Gastrointestinal:  Negative for abdominal pain and vomiting.   Genitourinary:  Negative for dysuria and hematuria.   Musculoskeletal:  Negative for arthralgias and joint swelling.   Skin:  Negative for pallor and rash.   Neurological:  Negative for syncope and headaches.   Psychiatric/Behavioral:  Negative for confusion and hallucinations.      Included in HPI  All systems reviewed and negative except for those discussed in HPI.      PHYSICAL EXAM    I have reviewed the triage vital signs and nursing notes.    ED Triage Vitals [07/28/24 2339]   Temp Heart Rate Resp BP SpO2   99.4 °F (37.4 °C) 90 22 158/90 94 %      Temp src Heart Rate Source Patient Position BP Location FiO2 (%)   Oral Monitor Sitting Right arm --       Physical Exam  Constitutional:       General: He is not in acute distress.     Appearance: Normal appearance.   HENT:      Head: Normocephalic and atraumatic.      Nose: Nose normal.      Mouth/Throat:      Mouth: Mucous membranes are moist.   Eyes:      Conjunctiva/sclera: Conjunctivae normal.      Pupils: Pupils are equal, round, and reactive to light.   Cardiovascular:      Rate and Rhythm: Normal rate and regular rhythm.      Pulses: Normal pulses.      Heart sounds: Normal heart sounds.   Pulmonary:      Effort: Tachypnea present.      Breath sounds: Wheezing present.   Abdominal:      General: There is no distension.   Musculoskeletal:         General: Normal range of motion.      Cervical back: Normal range of motion and neck supple.   Skin:      General: Skin is warm.      Capillary Refill: Capillary refill takes less than 2 seconds.   Neurological:      General: No focal deficit present.      Mental Status: He is alert and oriented to person, place, and time.   Psychiatric:         Mood and Affect: Mood normal.           LAB RESULTS  Recent Results (from the past 24 hour(s))   ECG 12 Lead ED Triage Standing Order; SOA    Collection Time: 07/28/24 11:31 PM   Result Value Ref Range    QT Interval 341 ms    QTC Interval 422 ms   Comprehensive Metabolic Panel    Collection Time: 07/28/24 11:49 PM    Specimen: Blood   Result Value Ref Range    Glucose 161 (H) 65 - 99 mg/dL    BUN 8 6 - 20 mg/dL    Creatinine 0.65 (L) 0.76 - 1.27 mg/dL    Sodium 138 136 - 145 mmol/L    Potassium 3.8 3.5 - 5.2 mmol/L    Chloride 102 98 - 107 mmol/L    CO2 24.0 22.0 - 29.0 mmol/L    Calcium 9.4 8.6 - 10.5 mg/dL    Total Protein 8.0 6.0 - 8.5 g/dL    Albumin 4.4 3.5 - 5.2 g/dL    ALT (SGPT) 38 1 - 41 U/L    AST (SGOT) 27 1 - 40 U/L    Alkaline Phosphatase 98 39 - 117 U/L    Total Bilirubin 0.2 0.0 - 1.2 mg/dL    Globulin 3.6 gm/dL    A/G Ratio 1.2 g/dL    BUN/Creatinine Ratio 12.3 7.0 - 25.0    Anion Gap 12.0 5.0 - 15.0 mmol/L    eGFR 127.6 >60.0 mL/min/1.73   BNP    Collection Time: 07/28/24 11:49 PM    Specimen: Blood   Result Value Ref Range    proBNP <36.0 0.0 - 450.0 pg/mL   Single High Sensitivity Troponin T    Collection Time: 07/28/24 11:49 PM    Specimen: Blood   Result Value Ref Range    HS Troponin T 8 <22 ng/L   Green Top (Gel)    Collection Time: 07/28/24 11:49 PM   Result Value Ref Range    Extra Tube Hold for add-ons.    Lavender Top    Collection Time: 07/28/24 11:49 PM   Result Value Ref Range    Extra Tube hold for add-on    Gold Top - SST    Collection Time: 07/28/24 11:49 PM   Result Value Ref Range    Extra Tube Hold for add-ons.    Light Blue Top    Collection Time: 07/28/24 11:49 PM   Result Value Ref Range    Extra Tube Hold for add-ons.    CBC Auto  Differential    Collection Time: 07/28/24 11:49 PM    Specimen: Blood   Result Value Ref Range    WBC 12.99 (H) 3.40 - 10.80 10*3/mm3    RBC 4.76 4.14 - 5.80 10*6/mm3    Hemoglobin 14.4 13.0 - 17.7 g/dL    Hematocrit 43.6 37.5 - 51.0 %    MCV 91.6 79.0 - 97.0 fL    MCH 30.3 26.6 - 33.0 pg    MCHC 33.0 31.5 - 35.7 g/dL    RDW 12.9 12.3 - 15.4 %    RDW-SD 43.0 37.0 - 54.0 fl    MPV 9.4 6.0 - 12.0 fL    Platelets 431 140 - 450 10*3/mm3    Neutrophil % 54.9 42.7 - 76.0 %    Lymphocyte % 36.0 19.6 - 45.3 %    Monocyte % 5.0 5.0 - 12.0 %    Eosinophil % 2.1 0.3 - 6.2 %    Basophil % 0.5 0.0 - 1.5 %    Immature Grans % 1.5 (H) 0.0 - 0.5 %    Neutrophils, Absolute 7.14 (H) 1.70 - 7.00 10*3/mm3    Lymphocytes, Absolute 4.67 (H) 0.70 - 3.10 10*3/mm3    Monocytes, Absolute 0.65 0.10 - 0.90 10*3/mm3    Eosinophils, Absolute 0.27 0.00 - 0.40 10*3/mm3    Basophils, Absolute 0.06 0.00 - 0.20 10*3/mm3    Immature Grans, Absolute 0.20 (H) 0.00 - 0.05 10*3/mm3    nRBC 0.0 0.0 - 0.2 /100 WBC   Procalcitonin    Collection Time: 07/28/24 11:49 PM    Specimen: Blood   Result Value Ref Range    Procalcitonin 0.06 0.00 - 0.25 ng/mL   Ethanol    Collection Time: 07/28/24 11:49 PM    Specimen: Blood   Result Value Ref Range    Ethanol <10 0 - 10 mg/dL    Ethanol % <0.010 %   Respiratory Panel PCR w/COVID-19(SARS-CoV-2) SANJEEV/ANDERS/MARY/PAD/COR/JANN In-House, NP Swab in UTM/VTM, 2 HR TAT - Swab, Nasopharynx    Collection Time: 07/29/24 12:05 AM    Specimen: Nasopharynx; Swab   Result Value Ref Range    ADENOVIRUS, PCR Not Detected Not Detected    Coronavirus 229E Not Detected Not Detected    Coronavirus HKU1 Not Detected Not Detected    Coronavirus NL63 Not Detected Not Detected    Coronavirus OC43 Not Detected Not Detected    COVID19 Not Detected Not Detected - Ref. Range    Human Metapneumovirus Not Detected Not Detected    Human Rhinovirus/Enterovirus Not Detected Not Detected    Influenza A PCR Not Detected Not Detected    Influenza B PCR Not  Detected Not Detected    Parainfluenza Virus 1 Not Detected Not Detected    Parainfluenza Virus 2 Not Detected Not Detected    Parainfluenza Virus 3 Not Detected Not Detected    Parainfluenza Virus 4 Not Detected Not Detected    RSV, PCR Not Detected Not Detected    Bordetella pertussis pcr Not Detected Not Detected    Bordetella parapertussis PCR Not Detected Not Detected    Chlamydophila pneumoniae PCR Not Detected Not Detected    Mycoplasma pneumo by PCR Not Detected Not Detected   Lactic Acid, Plasma    Collection Time: 07/29/24 12:05 AM    Specimen: Blood   Result Value Ref Range    Lactate 2.7 (C) 0.5 - 2.0 mmol/L   STAT Lactic Acid, Reflex    Collection Time: 07/29/24  2:26 AM    Specimen: Blood   Result Value Ref Range    Lactate 2.9 (C) 0.5 - 2.0 mmol/L   STAT Lactic Acid, Reflex    Collection Time: 07/29/24  5:18 AM    Specimen: Blood   Result Value Ref Range    Lactate 3.0 (C) 0.5 - 2.0 mmol/L         RADIOLOGY  XR Chest 1 View    Result Date: 7/29/2024  Patient: TEE MURPHY  Time Out: 01:34 Exam(s): XR CXR 1 VIEW EXAM:   XR Chest, 1 View CLINICAL HISTORY:    Reason for exam: SOA Triage Protocol. TECHNIQUE:   Frontal view of the chest. COMPARISON:   7 24 2024 FINDINGS:   Lungs:  No consolidation. No overt edema.   Pleural space:  No pleural effusion. No pneumothorax.   Heart:  Unremarkable.  No cardiomegaly. IMPRESSION:       No acute cardiopulmonary abnormality.     Electronically signed by Juliocesar Blulock MD on 07-29-24 at 0134       MEDICATIONS GIVEN IN ER  Medications   sodium chloride 0.9 % flush 10 mL (has no administration in time range)   cefTRIAXone (ROCEPHIN) 2,000 mg in sodium chloride 0.9 % 100 mL MBP (2,000 mg Intravenous New Bag 7/29/24 0600)   albuterol (PROVENTIL) nebulizer solution 0.083% 2.5 mg/3mL (2.5 mg Nebulization Given 7/29/24 0053)   methylPREDNISolone sodium succinate (SOLU-Medrol) injection 125 mg (125 mg Intravenous Given 7/29/24 0010)   sodium chloride 0.9 % bolus  1,000 mL (0 mL Intravenous Stopped 7/29/24 0328)         ORDERS PLACED DURING THIS VISIT:  Orders Placed This Encounter   Procedures    Respiratory Panel PCR w/COVID-19(SARS-CoV-2) [facility]JEEV/ANDERS/MARY/PAD/COR/JANN In-House, NP Swab in UTM/VTM, 2 HR TAT - Swab, Nasopharynx    Blood Culture - Blood,    Blood Culture - Blood,    XR Chest 1 View    [place] Draw    Comprehensive Metabolic Panel    BNP    Single High Sensitivity Troponin T    CBC Auto Differential    Procalcitonin    Lactic Acid, Plasma    Ethanol    STAT Lactic Acid, Reflex    STAT Lactic Acid, Reflex    STAT Lactic Acid, Reflex    NPO Diet NPO Type: Strict NPO    Undress & Gown    Continuous Pulse Oximetry    Vital Signs    LHA (on-call MD unless specified) Details    Oxygen Therapy- Nasal Cannula; Titrate 1-6 LPM Per SpO2; 90 - 95%    ECG 12 Lead ED Triage Standing Order; SOA    Insert Peripheral IV    CBC & Differential    Green Top (Gel)    Lavender Top    Gold Top - SST    Light Blue Top         OUTPATIENT MEDICATION MANAGEMENT:  Current Facility-Administered Medications Ordered in Epic   Medication Dose Route Frequency Provider Last Rate Last Admin    cefTRIAXone (ROCEPHIN) 2,000 mg in sodium chloride 0.9 % 100 mL MBP  2,000 mg Intravenous Once Mimi Dye PA-C 200 mL/hr at 07/29/24 0600 2,000 mg at 07/29/24 0600    sodium chloride 0.9 % flush 10 mL  10 mL Intravenous PRN Indio Mason MD         No current [facility]-ordered outpatient medications on file.           PROGRESS, DATA ANALYSIS, CONSULTS, AND MEDICAL DECISION MAKING  All labs have been independently interpreted by me.  All radiology studies have been reviewed by me. All EKG's have been independently viewed and interpreted by me.  Discussion below represents my analysis of pertinent findings related to patient's condition, differential diagnosis, treatment plan and final disposition.    Differential diagnosis includes but is not limited to aspiration pneumonia, multifocal pneumonia,  viral pneumonia.        ED Course as of 07/29/24 0625   Mon Jul 29, 2024 0002 WBC(!): 12.99 [MP]   0002 Hemoglobin: 14.4 [MP]   0006 Chest x-ray independently inter by me as no pneumothorax [MP]   0035 BUN: 8 [MP]   0035 Creatinine(!): 0.65 [MP]   0035 Lactate(!!): 2.7 [MP]   0412 Lactate(!!): 2.9 [MP]   0544 Lactic acid continues to elevate.  Will give empiric dose of antibiotics and plan for admission [MP]   0625 Pt care given to Santa Hunt PA-C, pending admission [MP]      ED Course User Index  [MP] Mimi Dye PA-C             AS OF 06:25 EDT VITALS:    BP - 126/83  HR - 90  TEMP - 98.3 °F (36.8 °C) (Oral)  O2 SATS - 95%    COMPLEXITY OF CARE  The patient requires admission.      DIAGNOSIS  Final diagnoses:   Lactic acidosis   Wheezing   Elevated BP without diagnosis of hypertension         DISPOSITION  ED Disposition       ED Disposition   Intended Admit    Condition   --    Comment   --                Please note that portions of this document were completed with a voice recognition program.    Note Disclaimer: At [facility], we believe that sharing information builds trust and better relationships. You are receiving this note because you recently visited [facility]. It is possible you will see health information before a provider has talked with you about it. This kind of information can be easy to misunderstand. To help you fully understand what it means for your health, we urge you to discuss this note with your provider.         Mimi Dye PA-C  07/29/24 0625

## 2024-07-29 NOTE — ED NOTES
"Pt family reports that the Pt is short of air, and that he became this way after taking a shower around 10:30. And that he has also developed a cough today. Pt family endorses that he was here on Thursday due to \"Intoxication.\"    Pt at triage appears to have trouble breathing, audible wheezing noted.   "

## 2024-07-29 NOTE — ED PROVIDER NOTES
MD ATTESTATION NOTE    The YOJANA and I have discussed this patient's history, physical exam, MDM, and treatment plan.  I have reviewed the documentation and personally had a face to face interaction with the patient. I affirm the documentation and agree with the treatment and plan.  The attached note describes my personal findings.      I provided a substantive portion of the medical decision making.        Brief HPI: Patient presents with complaint of cough and shortness of breath.  He reports wheezing prior to arrival.  He was recently hospitalized for pneumonia and alcohol intoxication.  He reports symptom improvement with neb treatment here.    PHYSICAL EXAM  ED Triage Vitals [07/28/24 2339]   Temp Heart Rate Resp BP SpO2   99.4 °F (37.4 °C) 90 22 158/90 94 %      Temp src Heart Rate Source Patient Position BP Location FiO2 (%)   Oral Monitor Sitting Right arm --         GENERAL: Awake and alert, no acute distress  HENT: nares patent  EYES: no scleral icterus  CV: regular rhythm, normal rate  RESPIRATORY: normal effort, no active wheezing, no stridor, lungs clear to auscultation bilaterally  MUSCULOSKELETAL: no deformity  NEURO: alert, moves all extremities, follows commands  PSYCH:  calm, cooperative  SKIN: warm, dry    Vital signs and nursing notes reviewed.        Medical Decision Making:  ED Course as of 07/29/24 0056   Mon Jul 29, 2024   0002 WBC(!): 12.99 [MP]   0002 Hemoglobin: 14.4 [MP]   0006 Chest x-ray independently inter by me as no pneumothorax [MP]   0035 BUN: 8 [MP]   0035 Creatinine(!): 0.65 [MP]   0035 Lactate(!!): 2.7 [MP]      ED Course User Index  [MP] Mimi Dye PA-C Ritchie, Joseph David, MD  07/29/24 0057

## 2024-07-29 NOTE — OUTREACH NOTE
COPD/PN Week 1 Survey      Flowsheet Row Responses   [facility] facility patient discharged from? [place]   Does the patient have one of the following disease processes/diagnoses(primary or secondary)? Pneumonia   Week 1 attempt successful? No   Unsuccessful attempts Attempt 1   Revoke Readmitted            Farnaz Villeda Registered Nurse

## 2024-07-29 NOTE — H&P
Patient Name:  Edin Alarcon  YOB: 1990  MRN:  [id number]  Admit Date:  7/28/2024  Patient Care Team:  Provider, No Known as PCP - General        Chief Complaint   Patient presents with    Shortness of Breath   Last 12 hours prior to presentation    History Present Illness     33 years old  gentleman with no significant past medical history who was recently hospitalized with alcohol and illicit substance intoxication and treated for pneumonia.  Sputum at that time revealed Staph aureus.  Blood cultures at that time were negative.  Patient states that he has not had any more alcohol and denies any illicit drug use or smoking he states that he started having shortness of breath several hours prior to presentation after taking a bath.  This was associated with cough and wheezing.  There was positive fever and chills.  There was no chest pain/no palpitations/no ankle edema/no hemoptysis/no PND or orthopnea.  In the emergency department chest x-ray was negative.  Ethanol level was negative.  Procalcitonin was normal.  CBC normal except a white count of 12.99.  Troponin was negative.  proBNP was negative.  His CMP was normal except random blood sugar of 161.  Lactic acid elevated at 2.7 with a repeat lactic acid at S3.  Respiratory PCR panel is negative.  Patient was subsequently admitted.        Review of Systems   Cardiovascular/respiratory.  As above.  GI.  No abdominal pain.  No nausea or vomiting.  Normal bowel movement without diarrhea or constipation or bleeding per rectum or melena.  .  No dysuria or hematuria  CNS.  No loss of consciousness or mentation changes.  No focal neurological symptoms.  Personal History     No past medical history on file.  No past surgical history on file.  No family history on file.  Social History     Tobacco Use    Smoking status: Never    Smokeless tobacco: Never   Vaping Use    Vaping status: Never Used   Substance Use Topics    Alcohol use:  Yes    Drug use: Yes     Types: Cocaine(coke), Fentanyl, Benzodiazepines     No current facility-administered medications on file prior to encounter.     Current Outpatient Medications on File Prior to Encounter   Medication Sig Dispense Refill    amoxicillin (AMOXIL) 250 MG capsule Take 1 capsule by mouth 3 (Three) Times a Day.       No Known Allergies    Objective    Objective     Vital Signs  Temp:  [98.3 °F (36.8 °C)-99.4 °F (37.4 °C)] 98.4 °F (36.9 °C)  Heart Rate:  [75-90] 75  Resp:  [20-24] 20  BP: (126-158)/(72-90) 134/75  SpO2:  [94 %-96 %] 96 %  on   ;   Device (Oxygen Therapy): room air  Body mass index is 24.43 kg/m².    Physical Exam  General.  A young gentleman.  He is alert and oriented x 4.  In no apparent pain/distress/diaphoresis.  Normal mood and affect.  Eyes.  Pupils equal round and reactive.  Intact extraocular musculature.  No pallor or jaundice.  Oral cavity.  Moist mucous membrane with no lesions.    Neck.  Supple.  No JVD.  No lymphadenopathy or thyromegaly.  Chest.  Poor bilateral air entry with no added sounds.  Cardiovascular.  Regular rate and rhythm with no gallops or murmurs.  Abdomen.  Soft lax.  No tenderness.  No organomegaly.  No guarding or rebound.  Extremities.  No clubbing/cyanosis/edema.  CNS.  No acute focal neurological deficits.      Results Review:  I reviewed the patient's new clinical results.  I reviewed the patient's new imaging results and agree with the interpretation.  I reviewed the patient's other test results and agree with the interpretation  I personally viewed and interpreted the patient's EKG/Telemetry data  Discussed with ED provider.    Lab Results (last 24 hours)       Procedure Component Value Units Date/Time    CBC & Differential [[id number]]  (Abnormal) Collected: 07/28/24 2349    Specimen: Blood Updated: 07/28/24 2359    Narrative:      The following orders were created for panel order CBC & Differential.  Procedure                                Abnormality         Status                     ---------                               -----------         ------                     CBC Auto Differential[[id number]]        Abnormal            Final result                 Please view results for these tests on the individual orders.    Comprehensive Metabolic Panel [[id number]]  (Abnormal) Collected: 07/28/24 2349    Specimen: Blood Updated: 07/29/24 0018     Glucose 161 mg/dL      BUN 8 mg/dL      Creatinine 0.65 mg/dL      Sodium 138 mmol/L      Potassium 3.8 mmol/L      Chloride 102 mmol/L      CO2 24.0 mmol/L      Calcium 9.4 mg/dL      Total Protein 8.0 g/dL      Albumin 4.4 g/dL      ALT (SGPT) 38 U/L      AST (SGOT) 27 U/L      Alkaline Phosphatase 98 U/L      Total Bilirubin 0.2 mg/dL      Globulin 3.6 gm/dL      A/G Ratio 1.2 g/dL      BUN/Creatinine Ratio 12.3     Anion Gap 12.0 mmol/L      eGFR 127.6 mL/min/1.73     Narrative:      GFR Normal >60  Chronic Kidney Disease <60  Kidney Failure <15      BNP [[id number]]  (Normal) Collected: 07/28/24 2349    Specimen: Blood Updated: 07/29/24 0018     proBNP <36.0 pg/mL     Narrative:      This assay is used as an aid in the diagnosis of individuals suspected of having heart failure. It can be used as an aid in the diagnosis of acute decompensated heart failure (ADHF) in patients presenting with signs and symptoms of ADHF to the emergency department (ED). In addition, NT-proBNP of <300 pg/mL indicates ADHF is not likely.    Age Range Result Interpretation  NT-proBNP Concentration (pg/mL:      <50             Positive            >450                   Gray                 300-450                    Negative             <300    50-75           Positive            >900                  Gray                300-900                  Negative            <300      >75             Positive            >1800                  Gray                300-1800                  Negative            <300    Single High Sensitivity  Troponin T [[id number]]  (Normal) Collected: 07/28/24 2349    Specimen: Blood Updated: 07/29/24 0018     HS Troponin T 8 ng/L     Narrative:      High Sensitive Troponin T Reference Range:  <14.0 ng/L- Negative Female for AMI  <22.0 ng/L- Negative Male for AMI  >=14 - Abnormal Female indicating possible myocardial injury.  >=22 - Abnormal Male indicating possible myocardial injury.   Clinicians would have to utilize clinical acumen, EKG, Troponin, and serial changes to determine if it is an Acute Myocardial Infarction or myocardial injury due to an underlying chronic condition.         CBC Auto Differential [[id number]]  (Abnormal) Collected: 07/28/24 2349    Specimen: Blood Updated: 07/28/24 2359     WBC 12.99 10*3/mm3      RBC 4.76 10*6/mm3      Hemoglobin 14.4 g/dL      Hematocrit 43.6 %      MCV 91.6 fL      MCH 30.3 pg      MCHC 33.0 g/dL      RDW 12.9 %      RDW-SD 43.0 fl      MPV 9.4 fL      Platelets 431 10*3/mm3      Neutrophil % 54.9 %      Lymphocyte % 36.0 %      Monocyte % 5.0 %      Eosinophil % 2.1 %      Basophil % 0.5 %      Immature Grans % 1.5 %      Neutrophils, Absolute 7.14 10*3/mm3      Lymphocytes, Absolute 4.67 10*3/mm3      Monocytes, Absolute 0.65 10*3/mm3      Eosinophils, Absolute 0.27 10*3/mm3      Basophils, Absolute 0.06 10*3/mm3      Immature Grans, Absolute 0.20 10*3/mm3      nRBC 0.0 /100 WBC     Procalcitonin [[id number]]  (Normal) Collected: 07/28/24 2349    Specimen: Blood Updated: 07/29/24 0044     Procalcitonin 0.06 ng/mL     Narrative:      As a Marker for Sepsis (Non-Neonates):    1. <0.5 ng/mL represents a low risk of severe sepsis and/or septic shock.  2. >2 ng/mL represents a high risk of severe sepsis and/or septic shock.    As a Marker for Lower Respiratory Tract Infections that require antibiotic therapy:    PCT on Admission    Antibiotic Therapy       6-12 Hrs later    >0.5                Strongly Recommended  >0.25 - <0.5        Recommended   0.1 - 0.25           "Discouraged              Remeasure/reassess PCT  <0.1                Strongly Discouraged     Remeasure/reassess PCT    As 28 day mortality risk marker: \"Change in Procalcitonin Result\" (>80% or <=80%) if Day 0 (or Day 1) and Day 4 values are available. Refer to http://www.[facility]-pct-calculator.com    Change in PCT <=80%  A decrease of PCT levels below or equal to 80% defines a positive change in PCT test result representing a higher risk for 28-day all-cause mortality of patients diagnosed with severe sepsis for septic shock.    Change in PCT >80%  A decrease of PCT levels of more than 80% defines a negative change in PCT result representing a lower risk for 28-day all-cause mortality of patients diagnosed with severe sepsis or septic shock.       Ethanol [[id number]] Collected: 07/28/24 2349    Specimen: Blood Updated: 07/29/24 0108     Ethanol <10 mg/dL      Ethanol % <0.010 %     Respiratory Panel PCR w/COVID-19(SARS-CoV-2) SANJEEV/ANDERS/MARY/PAD/COR/JANN In-House, NP Swab in UTM/VTM, 2 HR TAT - Swab, Nasopharynx [[id number]]  (Normal) Collected: 07/29/24 0005    Specimen: Swab from Nasopharynx Updated: 07/29/24 0101     ADENOVIRUS, PCR Not Detected     Coronavirus 229E Not Detected     Coronavirus HKU1 Not Detected     Coronavirus NL63 Not Detected     Coronavirus OC43 Not Detected     COVID19 Not Detected     Human Metapneumovirus Not Detected     Human Rhinovirus/Enterovirus Not Detected     Influenza A PCR Not Detected     Influenza B PCR Not Detected     Parainfluenza Virus 1 Not Detected     Parainfluenza Virus 2 Not Detected     Parainfluenza Virus 3 Not Detected     Parainfluenza Virus 4 Not Detected     RSV, PCR Not Detected     Bordetella pertussis pcr Not Detected     Bordetella parapertussis PCR Not Detected     Chlamydophila pneumoniae PCR Not Detected     Mycoplasma pneumo by PCR Not Detected    Narrative:      In the setting of a positive respiratory panel with a viral infection PLUS a negative " procalcitonin without other underlying concern for bacterial infection, consider observing off antibiotics or discontinuation of antibiotics and continue supportive care. If the respiratory panel is positive for atypical bacterial infection (Bordetella pertussis, Chlamydophila pneumoniae, or Mycoplasma pneumoniae), consider antibiotic de-escalation to target atypical bacterial infection.    Lactic Acid, Plasma [[id number]]  (Abnormal) Collected: 07/29/24 0005    Specimen: Blood Updated: 07/29/24 0033     Lactate 2.7 mmol/L     Blood Culture - Blood, Arm, Right [[id number]] Collected: 07/29/24 0040    Specimen: Blood from Arm, Right Updated: 07/29/24 0049    Blood Culture - Blood, Arm, Left [[id number]] Collected: 07/29/24 0040    Specimen: Blood from Arm, Left Updated: 07/29/24 0049    STAT Lactic Acid, Reflex [[id number]]  (Abnormal) Collected: 07/29/24 0226    Specimen: Blood Updated: 07/29/24 0251     Lactate 2.9 mmol/L     STAT Lactic Acid, Reflex [[id number]]  (Abnormal) Collected: 07/29/24 0518    Specimen: Blood Updated: 07/29/24 0543     Lactate 3.0 mmol/L     STAT Lactic Acid, Reflex [[id number]]  (Abnormal) Collected: 07/29/24 0840    Specimen: Blood Updated: 07/29/24 0904     Lactate 4.3 mmol/L             Imaging Results (Last 24 Hours)       Procedure Component Value Units Date/Time    XR Chest 1 View [[id number]] Collected: 07/29/24 0135     Updated: 07/29/24 0135    Narrative:        Patient: ETE MURPHY  Time Out: 01:34  Exam(s): XR CXR 1 VIEW     EXAM:    XR Chest, 1 View    CLINICAL HISTORY:     Reason for exam: SOA Triage Protocol.    TECHNIQUE:    Frontal view of the chest.    COMPARISON:    7 24 2024    FINDINGS:    Lungs:  No consolidation. No overt edema.    Pleural space:  No pleural effusion. No pneumothorax.    Heart:  Unremarkable.  No cardiomegaly.    IMPRESSION:         No acute cardiopulmonary abnormality.      Impression:          Electronically signed by Juliocesar Bullock MD on  07-29-24 at 0134                ECG 12 Lead ED Triage Standing Order; SOA   Final Result   HEART RATE=92  bpm   RR [place]=547  ms   LA Jmmnrcmr=147  ms   P Horizontal Axis=35  deg   P Front Axis=7  deg   QRSD Xxvgkrmr=648  ms   QT Bhoakssv=504  ms   HNhE=721  ms   QRS Axis=Invalid  deg   T Wave Axis=-1  deg   - NORMAL ECG -   Sinus rhythm   When compared with ECG of 22-Jul-2024 20:38:56,   No significant change   Electronically Signed By: Rashawn Garcia ([facility]) 2024-07-29 07:46:34   Date and Time of Study:2024-07-28 23:31:03               Assessment/Plan     Active Hospital Problems    Diagnosis  POA    **Dyspnea [R06.00]  Yes    Lower respiratory tract infection [J22]  Yes    Hyperglycemia [R73.9]  Yes    History of substance use [Z87.898]  Not Applicable    History of alcohol use [Z87.898]  Not Applicable      Resolved Hospital Problems   No resolved problems to display.           Lower respiratory tract infection.  Positive leukocytosis and elevated lactic acid.  Normal procalcitonin/normal proBNP/normal troponin.  Respiratory PCR panel is negative.  The plan check blood cultures.  Check CT scan of the chest without contrast.  Check urine Legionella and Streptococcus antigen.  Check sputum cultures.  Will initiate IV Rocephin and IV Zithromax.  Will initiate Mucinex/incentive spirometry/DuoNeb's.  Patient has been treated for recent pneumonia.  With the sputum growing Staph aureus sensitive to doxycycline.  Hyperglycemia.  Check A1c.  Alcohol and substance use.  Patient states that he does not use his alcohol or illicit substance on regular basis.  He has denied any recent use of bowls.  Alcohol level is negative.  Will check urine tox screen.  VTE prophylaxis.  Sequential compression device.      Discussed my findings and plan of treatment with the patient/nurse.  Disposition.  Hopefully home in 1 to 2 days.      Code Status -full code.       Katja Gao MD  [facility]  07/29/24  10:11 EDT

## 2024-07-29 NOTE — PLAN OF CARE
Goal Outcome Evaluation:              Outcome Evaluation: Pt received from ED A/O x4. VSS, RA. Up ad liset. LA tranding up, attending informed

## 2024-07-29 NOTE — ED NOTES
Nursing report ED to floor  Edin Alarcon  33 y.o.  male    HPI :  HPI (Adult)  Stated Reason for Visit: SOA since about 10:30pm after taking a shower per his family.  History Obtained From: family    Chief Complaint  Chief Complaint   Patient presents with    Shortness of Breath       Admitting doctor:   Katja Gao MD    Admitting diagnosis:   The primary encounter diagnosis was Lactic acidosis. Diagnoses of Wheezing and Elevated BP without diagnosis of hypertension were also pertinent to this visit.    Code status:   Current Code Status       Date Active Code Status Order ID Comments User Context       Prior            Allergies:   Patient has no known allergies.    Isolation:   No active isolations    Intake and Output    Intake/Output Summary (Last 24 hours) at 7/29/2024 0722  Last data filed at 7/29/2024 0630  Gross per 24 hour   Intake 1100 ml   Output --   Net 1100 ml       Weight:       07/28/24  2339   Weight: 70.6 kg (155 lb 10.3 oz)       Most recent vitals:   Vitals:    07/29/24 0010 07/29/24 0013 07/29/24 0554 07/29/24 0722   BP:   126/83 126/72   BP Location:   Left arm    Patient Position:   Lying    Pulse:       Resp: 24 24 20    Temp:   98.3 °F (36.8 °C)    TempSrc:   Oral    SpO2:   95%    Weight:       Height:           Active LDAs/IV Access:   Lines, Drains & Airways       Active LDAs       Name Placement date Placement time Site Days    Peripheral IV 07/28/24 2350 Anterior;Right Forearm 07/28/24  2350  Forearm  less than 1                    Labs (abnormal labs have a star):   Labs Reviewed   COMPREHENSIVE METABOLIC PANEL - Abnormal; Notable for the following components:       Result Value    Glucose 161 (*)     Creatinine 0.65 (*)     All other components within normal limits    Narrative:     GFR Normal >60  Chronic Kidney Disease <60  Kidney Failure <15     CBC WITH AUTO DIFFERENTIAL - Abnormal; Notable for the following components:    WBC 12.99 (*)     Immature Grans % 1.5  (*)     Neutrophils, Absolute 7.14 (*)     Lymphocytes, Absolute 4.67 (*)     Immature Grans, Absolute 0.20 (*)     All other components within normal limits   LACTIC ACID, PLASMA - Abnormal; Notable for the following components:    Lactate 2.7 (*)     All other components within normal limits   LACTIC ACID, REFLEX - Abnormal; Notable for the following components:    Lactate 2.9 (*)     All other components within normal limits   LACTIC ACID, REFLEX - Abnormal; Notable for the following components:    Lactate 3.0 (*)     All other components within normal limits   RESPIRATORY PANEL PCR W/ COVID-19 (SARS-COV-2), NP SWAB IN UTM/VTP, 2 HR TAT - Normal    Narrative:     In the setting of a positive respiratory panel with a viral infection PLUS a negative procalcitonin without other underlying concern for bacterial infection, consider observing off antibiotics or discontinuation of antibiotics and continue supportive care. If the respiratory panel is positive for atypical bacterial infection (Bordetella pertussis, Chlamydophila pneumoniae, or Mycoplasma pneumoniae), consider antibiotic de-escalation to target atypical bacterial infection.   BNP (IN-HOUSE) - Normal    Narrative:     This assay is used as an aid in the diagnosis of individuals suspected of having heart failure. It can be used as an aid in the diagnosis of acute decompensated heart failure (ADHF) in patients presenting with signs and symptoms of ADHF to the emergency department (ED). In addition, NT-proBNP of <300 pg/mL indicates ADHF is not likely.    Age Range Result Interpretation  NT-proBNP Concentration (pg/mL:      <50             Positive            >450                   Gray                 300-450                    Negative             <300    50-75           Positive            >900                  Gray                300-900                  Negative            <300      >75             Positive            >1800                  Delgado            "     300-1800                  Negative            <300   SINGLE HS TROPONIN T - Normal    Narrative:     High Sensitive Troponin T Reference Range:  <14.0 ng/L- Negative Female for AMI  <22.0 ng/L- Negative Male for AMI  >=14 - Abnormal Female indicating possible myocardial injury.  >=22 - Abnormal Male indicating possible myocardial injury.   Clinicians would have to utilize clinical acumen, EKG, Troponin, and serial changes to determine if it is an Acute Myocardial Infarction or myocardial injury due to an underlying chronic condition.        PROCALCITONIN - Normal    Narrative:     As a Marker for Sepsis (Non-Neonates):    1. <0.5 ng/mL represents a low risk of severe sepsis and/or septic shock.  2. >2 ng/mL represents a high risk of severe sepsis and/or septic shock.    As a Marker for Lower Respiratory Tract Infections that require antibiotic therapy:    PCT on Admission    Antibiotic Therapy       6-12 Hrs later    >0.5                Strongly Recommended  >0.25 - <0.5        Recommended   0.1 - 0.25          Discouraged              Remeasure/reassess PCT  <0.1                Strongly Discouraged     Remeasure/reassess PCT    As 28 day mortality risk marker: \"Change in Procalcitonin Result\" (>80% or <=80%) if Day 0 (or Day 1) and Day 4 values are available. Refer to http://www.[facility]-pct-calculator.com    Change in PCT <=80%  A decrease of PCT levels below or equal to 80% defines a positive change in PCT test result representing a higher risk for 28-day all-cause mortality of patients diagnosed with severe sepsis for septic shock.    Change in PCT >80%  A decrease of PCT levels of more than 80% defines a negative change in PCT result representing a lower risk for 28-day all-cause mortality of patients diagnosed with severe sepsis or septic shock.      BLOOD CULTURE   BLOOD CULTURE   RAINBOW DRAW    Narrative:     The following orders were created for panel order [place] Draw.  Procedure                       "         Abnormality         Status                     ---------                               -----------         ------                     Green Top (Gel)[[id number]]                                  Final result               Lavender Top[[id number]]                                     Final result               Gold Top - SST[[id number]]                                   Final result               Light Blue Top[[id number]]                                   Final result                 Please view results for these tests on the individual orders.   ETHANOL   LACTIC ACID, REFLEX   CBC AND DIFFERENTIAL    Narrative:     The following orders were created for panel order CBC & Differential.  Procedure                               Abnormality         Status                     ---------                               -----------         ------                     CBC Auto Differential[[id number]]        Abnormal            Final result                 Please view results for these tests on the individual orders.   GREEN TOP   LAVENDER TOP   GOLD TOP - SST   LIGHT BLUE TOP       EKG:   ECG 12 Lead ED Triage Standing Order; SOA   Preliminary Result   HEART RATE=92  bpm   RR [place]=311  ms   UT Fhlsusmf=406  ms   P Horizontal Axis=35  deg   P Front Axis=7  deg   QRSD Svdqxwjx=650  ms   QT Fkifwcss=978  ms   IUsO=699  ms   QRS Axis=0  deg   T Wave Axis=-1  deg   - NORMAL ECG -   Sinus rhythm   Lead(s) aVF were not used for morphology analysis   Date and Time of Study:2024-07-28 23:31:03          Meds given in ED:   Medications   sodium chloride 0.9 % flush 10 mL (has no administration in time range)   albuterol (PROVENTIL) nebulizer solution 0.083% 2.5 mg/3mL (2.5 mg Nebulization Given 7/29/24 0053)   methylPREDNISolone sodium succinate (SOLU-Medrol) injection 125 mg (125 mg Intravenous Given 7/29/24 0010)   sodium chloride 0.9 % bolus 1,000 mL (0 mL Intravenous Stopped 7/29/24 0328)   cefTRIAXone (ROCEPHIN) 2,000 mg in  sodium chloride 0.9 % 100 mL MBP (0 mg Intravenous Stopped 7/29/24 0630)       Imaging results:  XR Chest 1 View    Result Date: 7/29/2024  Electronically signed by Juliocesar Bullock MD on 07-29-24 at 0134     Ambulatory status:   - ambulatory     Social issues:   Social History     Socioeconomic History    Marital status: Single   Tobacco Use    Smoking status: Never    Smokeless tobacco: Never   Vaping Use    Vaping status: Never Used   Substance and Sexual Activity    Alcohol use: Yes    Drug use: Yes     Types: Cocaine(coke), Fentanyl, Benzodiazepines    Sexual activity: Defer     Partners: Female       Peripheral Neurovascular  Peripheral Neurovascular (Adult)  Peripheral Neurovascular WDL: WDL    Neuro Cognitive  Neuro Cognitive (Adult)  Cognitive/Neuro/Behavioral WDL: WDL    Learning  Learning Assessment (Adult)  Learning Readiness and Ability: no barriers identified    Respiratory  Respiratory WDL  Respiratory WDL: .WDL except  Breath Sounds  Breath Sounds: All Fields  All Lung Fields Breath Sounds: Anterior:, Lateral:, wheezes, expiratory, wheezes, inspiratory  Breath Sounds Post-Respiratory Treatment  Breath Sounds Posttreatment All Fields: All Fields  Breath Sounds Posttreatment All Fields: Anterior:, Lateral:, no change    Abdominal Pain       Pain Assessments  Pain (Adult)  (0-10) Pain Rating: Rest: 0    NIH Stroke Scale       Nicci Bhat RN  07/29/24 07:22 EDT

## 2024-07-29 NOTE — CASE MANAGEMENT/SOCIAL WORK
Discharge Planning Assessment  [facility]     Patient Name: Edin Alarcon  MRN: [id number]  Today's Date: 7/29/2024    Admit Date: 7/28/2024    Plan: plans to return home- CCP will follow   Discharge Needs Assessment       Row Name 07/29/24 1418       Living Environment    People in Home sibling(s);other (see comments)    Current Living Arrangements home    Potentially Unsafe Housing Conditions none    Primary Care Provided by self    Provides Primary Care For no one    Family Caregiver if Needed sibling(s)    Quality of Family Relationships helpful;involved;supportive    Able to Return to Prior Arrangements yes       Resource/Environmental Concerns    Resource/Environmental Concerns none    Transportation Concerns none       Transition Planning    Patient/Family Anticipates Transition to home with family    Patient/Family Anticipated Services at Transition none    Transportation Anticipated family or friend will provide       Discharge Needs Assessment    Readmission Within the Last 30 Days no previous admission in last 30 days    Equipment Currently Used at Home none    Concerns to be Addressed denies needs/concerns at this time    Anticipated Changes Related to Illness none                   Discharge Plan       Row Name 07/29/24 1419       Plan    Plan plans to return home- CCP will follow    Patient/Family in Agreement with Plan yes    Plan Comments Spoke with patient in room using , Astrid [id number], as patient is Welsh speaking. Introduced self and explained role. Facesheet verified. Patient lives with his sister, Chacha [phone], brother in law, nieces and nephews in an apartment. At baseline, he is IADLS. He does not use any DME and does not have a HH or SNF history. Patient was recently hospitalized at [facility] and was screened by [facility]. He doesn't qualify for medicaid because he isn't a [place] citizen, and is over income. Provided patint with financial assistance application and  clinic list. CCP will follow.                  Continued Care and Services - Admitted Since 7/28/2024    No active coordination exists for this encounter.       Expected Discharge Date and Time       Expected Discharge Date Expected Discharge Time    Jul 31, 2024            Demographic Summary       Row Name 07/29/24 1424       General Information    Preferred Language Korean      Row Name 07/29/24 1418       General Information    Admission Type observation    Arrived From emergency department    Referral Source admission list    Reason for Consult discharge planning    Preferred Language English                   Functional Status       Row Name 07/29/24 1418       Functional Status    Usual Activity Tolerance good    Current Activity Tolerance moderate       Functional Status, IADL    Medications independent    Meal Preparation independent    Housekeeping independent    Laundry independent    Shopping independent       Mental Status    General Appearance WDL WDL                   Psychosocial    No documentation.                  Abuse/Neglect    No documentation.                  Legal    No documentation.                  Substance Abuse    No documentation.                  Patient Forms    No documentation.                     Rhina Young RN

## 2024-07-30 LAB
ANION GAP SERPL CALCULATED.3IONS-SCNC: 11.4 MMOL/L (ref 5–15)
BASOPHILS # BLD AUTO: 0.05 10*3/MM3 (ref 0–0.2)
BASOPHILS NFR BLD AUTO: 0.3 % (ref 0–1.5)
BUN SERPL-MCNC: 10 MG/DL (ref 6–20)
BUN/CREAT SERPL: 14.7 (ref 7–25)
CALCIUM SPEC-SCNC: 9.1 MG/DL (ref 8.6–10.5)
CHLORIDE SERPL-SCNC: 105 MMOL/L (ref 98–107)
CO2 SERPL-SCNC: 22.6 MMOL/L (ref 22–29)
CREAT SERPL-MCNC: 0.68 MG/DL (ref 0.76–1.27)
D-LACTATE SERPL-SCNC: 2.5 MMOL/L (ref 0.5–2)
D-LACTATE SERPL-SCNC: 4 MMOL/L (ref 0.5–2)
DEPRECATED RDW RBC AUTO: 42.7 FL (ref 37–54)
EGFRCR SERPLBLD CKD-EPI 2021: 125.9 ML/MIN/1.73
EOSINOPHIL # BLD AUTO: 0.03 10*3/MM3 (ref 0–0.4)
EOSINOPHIL NFR BLD AUTO: 0.2 % (ref 0.3–6.2)
ERYTHROCYTE [DISTWIDTH] IN BLOOD BY AUTOMATED COUNT: 13 % (ref 12.3–15.4)
GLUCOSE SERPL-MCNC: 100 MG/DL (ref 65–99)
HCT VFR BLD AUTO: 38.7 % (ref 37.5–51)
HGB BLD-MCNC: 13.4 G/DL (ref 13–17.7)
IMM GRANULOCYTES # BLD AUTO: 0.14 10*3/MM3 (ref 0–0.05)
IMM GRANULOCYTES NFR BLD AUTO: 0.8 % (ref 0–0.5)
LYMPHOCYTES # BLD AUTO: 5.06 10*3/MM3 (ref 0.7–3.1)
LYMPHOCYTES NFR BLD AUTO: 27.2 % (ref 19.6–45.3)
MCH RBC QN AUTO: 31.2 PG (ref 26.6–33)
MCHC RBC AUTO-ENTMCNC: 34.6 G/DL (ref 31.5–35.7)
MCV RBC AUTO: 90.2 FL (ref 79–97)
MONOCYTES # BLD AUTO: 0.89 10*3/MM3 (ref 0.1–0.9)
MONOCYTES NFR BLD AUTO: 4.8 % (ref 5–12)
NEUTROPHILS NFR BLD AUTO: 12.43 10*3/MM3 (ref 1.7–7)
NEUTROPHILS NFR BLD AUTO: 66.7 % (ref 42.7–76)
NRBC BLD AUTO-RTO: 0 /100 WBC (ref 0–0.2)
PLATELET # BLD AUTO: 445 10*3/MM3 (ref 140–450)
PMV BLD AUTO: 9.6 FL (ref 6–12)
POTASSIUM SERPL-SCNC: 3.9 MMOL/L (ref 3.5–5.2)
RBC # BLD AUTO: 4.29 10*6/MM3 (ref 4.14–5.8)
SODIUM SERPL-SCNC: 139 MMOL/L (ref 136–145)
WBC NRBC COR # BLD AUTO: 18.6 10*3/MM3 (ref 3.4–10.8)

## 2024-07-30 PROCEDURE — 94799 UNLISTED PULMONARY SVC/PX: CPT

## 2024-07-30 PROCEDURE — 25010000002 VANCOMYCIN HCL 1.25 G RECONSTITUTED SOLUTION 1 EACH VIAL: Performed by: STUDENT IN AN ORGANIZED HEALTH CARE EDUCATION/TRAINING PROGRAM

## 2024-07-30 PROCEDURE — 94664 DEMO&/EVAL PT USE INHALER: CPT

## 2024-07-30 PROCEDURE — G0378 HOSPITAL OBSERVATION PER HR: HCPCS

## 2024-07-30 PROCEDURE — 25010000002 CEFTRIAXONE PER 250 MG: Performed by: INTERNAL MEDICINE

## 2024-07-30 PROCEDURE — 96366 THER/PROPH/DIAG IV INF ADDON: CPT

## 2024-07-30 PROCEDURE — 25010000002 VANCOMYCIN 10 G RECONSTITUTED SOLUTION: Performed by: STUDENT IN AN ORGANIZED HEALTH CARE EDUCATION/TRAINING PROGRAM

## 2024-07-30 PROCEDURE — 83605 ASSAY OF LACTIC ACID: CPT | Performed by: STUDENT IN AN ORGANIZED HEALTH CARE EDUCATION/TRAINING PROGRAM

## 2024-07-30 PROCEDURE — 25010000002 CEFTRIAXONE PER 250 MG: Performed by: STUDENT IN AN ORGANIZED HEALTH CARE EDUCATION/TRAINING PROGRAM

## 2024-07-30 PROCEDURE — 25810000003 SODIUM CHLORIDE 0.9 % SOLUTION 250 ML FLEX CONT: Performed by: STUDENT IN AN ORGANIZED HEALTH CARE EDUCATION/TRAINING PROGRAM

## 2024-07-30 PROCEDURE — 85025 COMPLETE CBC W/AUTO DIFF WBC: CPT | Performed by: INTERNAL MEDICINE

## 2024-07-30 PROCEDURE — 94761 N-INVAS EAR/PLS OXIMETRY MLT: CPT

## 2024-07-30 PROCEDURE — 80048 BASIC METABOLIC PNL TOTAL CA: CPT | Performed by: INTERNAL MEDICINE

## 2024-07-30 PROCEDURE — 96367 TX/PROPH/DG ADDL SEQ IV INF: CPT

## 2024-07-30 PROCEDURE — 25810000003 SODIUM CHLORIDE 0.9 % SOLUTION: Performed by: STUDENT IN AN ORGANIZED HEALTH CARE EDUCATION/TRAINING PROGRAM

## 2024-07-30 PROCEDURE — 25810000003 SODIUM CHLORIDE 0.9 % SOLUTION: Performed by: INTERNAL MEDICINE

## 2024-07-30 RX ORDER — VANCOMYCIN/0.9 % SOD CHLORIDE 1.5G/250ML
20 PLASTIC BAG, INJECTION (ML) INTRAVENOUS ONCE
Status: COMPLETED | OUTPATIENT
Start: 2024-07-30 | End: 2024-07-30

## 2024-07-30 RX ADMIN — IPRATROPIUM BROMIDE AND ALBUTEROL SULFATE 3 ML: .5; 3 SOLUTION RESPIRATORY (INHALATION) at 06:56

## 2024-07-30 RX ADMIN — IPRATROPIUM BROMIDE AND ALBUTEROL SULFATE 3 ML: .5; 3 SOLUTION RESPIRATORY (INHALATION) at 22:38

## 2024-07-30 RX ADMIN — CEFTRIAXONE SODIUM 1000 MG: 1 INJECTION, POWDER, FOR SOLUTION INTRAMUSCULAR; INTRAVENOUS at 05:56

## 2024-07-30 RX ADMIN — IPRATROPIUM BROMIDE AND ALBUTEROL SULFATE 3 ML: .5; 3 SOLUTION RESPIRATORY (INHALATION) at 15:18

## 2024-07-30 RX ADMIN — GUAIFENESIN 1200 MG: 600 TABLET, EXTENDED RELEASE ORAL at 20:27

## 2024-07-30 RX ADMIN — SENNOSIDES AND DOCUSATE SODIUM 2 TABLET: 50; 8.6 TABLET ORAL at 08:18

## 2024-07-30 RX ADMIN — VANCOMYCIN HYDROCHLORIDE 1250 MG: 1.25 INJECTION, POWDER, LYOPHILIZED, FOR SOLUTION INTRAVENOUS at 22:14

## 2024-07-30 RX ADMIN — VANCOMYCIN HYDROCHLORIDE 1500 MG: 10 INJECTION, POWDER, LYOPHILIZED, FOR SOLUTION INTRAVENOUS at 11:37

## 2024-07-30 RX ADMIN — GUAIFENESIN 1200 MG: 600 TABLET, EXTENDED RELEASE ORAL at 08:18

## 2024-07-30 RX ADMIN — Medication 10 ML: at 21:56

## 2024-07-30 RX ADMIN — FAMOTIDINE 40 MG: 20 TABLET, FILM COATED ORAL at 08:18

## 2024-07-30 RX ADMIN — SENNOSIDES AND DOCUSATE SODIUM 2 TABLET: 50; 8.6 TABLET ORAL at 20:27

## 2024-07-30 RX ADMIN — SODIUM CHLORIDE 100 ML/HR: 9 INJECTION, SOLUTION INTRAVENOUS at 11:37

## 2024-07-30 RX ADMIN — CEFTRIAXONE SODIUM 1000 MG: 1 INJECTION, POWDER, FOR SOLUTION INTRAMUSCULAR; INTRAVENOUS at 10:59

## 2024-07-30 RX ADMIN — Medication 10 ML: at 08:18

## 2024-07-30 NOTE — PROGRESS NOTES
Name: Edin Alarcon ADMIT: 2024   : 1990  PCP: Provider, No Known    MRN: [id number] LOS: 0 days   AGE/SEX: 33 y.o. male  ROOM: [facility]     Subjective   Subjective    used for communication.  Patient is feeling better, no shortness of breath, continues to have productive cough,.  On room air.  No fevers no chills.    Review of Systems   As above  Objective   Objective   Vital Signs  Temp:  [97.7 °F (36.5 °C)-99.1 °F (37.3 °C)] 97.7 °F (36.5 °C)  Heart Rate:  [68-94] 83  Resp:  [18-20] 20  BP: (118-130)/(67-82) 125/82  SpO2:  [96 %-100 %] 99 %  on   ;   Device (Oxygen Therapy): room air  Body mass index is 24.15 kg/m².  Physical Exam    General: Alert, laying in bed,  HEENT: Normocephalic, atraumatic  CV: RRR, no  murmurs  Lungs: Clear to auscultation bilaterally, no crackles or wheezes  Abdomen: Soft, nontender, nondistended  Extremities: No significant peripheral edema , no cyanosis     Results Review     I reviewed the patient's new clinical results.  Results from last 7 days   Lab Units 24  0318 24  2349 24  0550   WBC 10*3/mm3 18.60* 12.99* 13.15*   HEMOGLOBIN g/dL 13.4 14.4 13.3   PLATELETS 10*3/mm3 445 431 273     Results from last 7 days   Lab Units 24  0318 24  2349   SODIUM mmol/L 139 138   POTASSIUM mmol/L 3.9 3.8   CHLORIDE mmol/L 105 102   CO2 mmol/L 22.6 24.0   BUN mg/dL 10 8   CREATININE mg/dL 0.68* 0.65*   GLUCOSE mg/dL 100* 161*   Estimated Creatinine Clearance: 152.5 mL/min (A) (by C-G formula based on SCr of 0.68 mg/dL (L)).  Results from last 7 days   Lab Units 24  2349   ALBUMIN g/dL 4.4   BILIRUBIN mg/dL 0.2   ALK PHOS U/L 98   AST (SGOT) U/L 27   ALT (SGPT) U/L 38     Results from last 7 days   Lab Units 24  0318 24  2349   CALCIUM mg/dL 9.1 9.4   ALBUMIN g/dL  --  4.4     Results from last 7 days   Lab Units 24  0809 24  1714 24  1114 24  0840 24  0005 24  2349    PROCALCITONIN ng/mL  --   --   --   --   --  0.06   LACTATE mmol/L 4.0* 3.9* 4.6* 4.3*   < >  --     < > = values in this interval not displayed.     COVID19   Date Value Ref Range Status   07/29/2024 Not Detected Not Detected - Ref. Range Final   07/21/2024 Not Detected Not Detected - Ref. Range Final     Hemoglobin A1C   Date/Time Value Ref Range Status   07/29/2024 1114 6.00 (H) 4.80 - 5.60 % Final           CT Chest Without Contrast Diagnostic  Narrative: CT CHEST WO CONTRAST DIAGNOSTIC-     INDICATION: Cough and shortness of breath     COMPARISON: None     TECHNIQUE:  Routine CT chest without IV contrast. Coronal and sagittal reformats.  Radiation dose reduction techniques were utilized, including automated  exposure control and exposure modulation based on body size.     FINDINGS:      Chest wall: No lymphadenopathy.     Mediastinum: No coronary artery atherosclerotic calcifications seen.  Heart is at upper limits in size. No pericardial effusion. No  mediastinal or hilar lymphadenopathy.     Lungs/pleura: No effusions. Respiratory motion artifact. Patent central  airways. Small amount of posterior dependent and bibasilar subsegmental  atelectasis.     Upper abdomen: Unremarkable     Osseous structures: No destructive osseous lesions.     Impression:    1. No acute findings identified in the chest.  2. Heart is at upper limits in size.  3. Small amount of posterior dependent and bibasilar subsegmental  atelectasis.  4. Motion limited exam     This report was finalized on 7/29/2024 12:32 PM by Dr. Andre Peraza M.D on Workstation: [id number]     XR Chest 1 View  Narrative: Patient: TEE MURPHY  Time Out: 01:34  Exam(s): XR CXR 1 VIEW     EXAM:    XR Chest, 1 View    CLINICAL HISTORY:     Reason for exam: SOA Triage Protocol.    TECHNIQUE:    Frontal view of the chest.    COMPARISON:    7 24 2024    FINDINGS:    Lungs:  No consolidation. No overt edema.    Pleural space:  No pleural  effusion. No pneumothorax.    Heart:  Unremarkable.  No cardiomegaly.    IMPRESSION:         No acute cardiopulmonary abnormality.  Impression: Electronically signed by Juliocesar Bullock MD on 07-29-24 at 0134    Scheduled Medications  [START ON 7/31/2024] cefTRIAXone, 2,000 mg, Intravenous, Q24H  famotidine, 40 mg, Oral, Daily  guaiFENesin, 1,200 mg, Oral, Q12H  ipratropium-albuterol, 3 mL, Nebulization, Q8H - RT  senna-docusate sodium, 2 tablet, Oral, BID  sodium chloride, 10 mL, Intravenous, Q12H  vancomycin, 1,250 mg, Intravenous, Q12H    Infusions  Pharmacy to dose vancomycin,   sodium chloride, 100 mL/hr, Last Rate: 100 mL/hr (07/30/24 1137)    Diet  Diet: Regular/House; Fluid Consistency: Thin (IDDSI 0)    I have personally reviewed     []  Laboratory   []  Microbiology   []  Radiology   []  EKG/Telemetry  []  Cardiology/Vascular   []  Pathology    []  Records       Assessment/Plan     Active Hospital Problems    Diagnosis  POA    **Dyspnea [R06.00]  Yes    Lower respiratory tract infection [J22]  Yes    Hyperglycemia [R73.9]  Yes    History of substance use [Z87.898]  Not Applicable    History of alcohol use [Z87.898]  Not Applicable      Resolved Hospital Problems   No resolved problems to display.       33 years old  gentleman with no significant past medical history who was recently hospitalized with alcohol and illicit substance intoxication and treated for pneumonia. Sputum at that time revealed Staph aureus. Blood cultures at that time were negative .    Lower respiratory tract infection.  -  normal procalcitonin/normal proBNP/normal troponin.  Respiratory PCR panel is negative.  The plan check blood cultures.  Check CT scan of the chest without contrast.  Check urine Legionella and Streptococcus antigen.  Check sputum cultures.  Will initiate IV Rocephin and IV Zithromax.  Will initiate Mucinex/incentive spirometry/DuoNeb's.  Patient has been treated for recent pneumonia.  With the sputum  growing Staph aureus sensitive to doxycycline.  -Lactic acid remains elevated around 4 however per chart review lactic acid during last presentation was on 7/21/2024 was 9.0, improved but did not normalize, was 2.3 prior to discharge.  Suspect might have underlying elevated lactic acid at baseline.  -WBC trending up.  18.60 this morning.  Continue IV fluids, increase IV ceftriaxone to 2 g every 24 hours, add IV vancomycin.  Monitor culture results.    Hyperglycemia.    -Hemoglobin A1c 6.0, consistent with prediabetes.    Alcohol and substance use.   - Patient states that he does not use his alcohol or illicit substance on regular basis.    -Reports has not    CODE STATUS: Full code  Disposition: Home, timing to be determined, likely 1 to 2 days pending culture results and clinical status    Copied text in this note has been reviewed and is accurate as of 07/30/24.         Dictated utilizing Dragon dictation        Treasure Guzman MD  [facility]  07/30/24  16:42 EDT

## 2024-07-30 NOTE — NURSING NOTE
Pt occasionally bradycardic during the night in the 50's, appears to have slept through the lower HR without adverse affects.

## 2024-07-30 NOTE — PROGRESS NOTES
"[facility] Clinical Pharmacy Services: Vancomycin Pharmacokinetic Initial Consult Note    Edin Alarcon is a 33 y.o. male who is on day 1 of pharmacy to dose vancomycin.    Indication: Sepsis  Consulting Provider: Dr. Guzman  Planned Duration of Therapy: 7 days  Loading Dose Ordered or Given: 1500 mg on 7/30 planned  MRSA PCR performed: ; Result:   Culture/Source:   Target: -600 mg/L.hr   Pertinent Vanc Dosing History:   Other Antimicrobials: ceftriaxone 2g     Vitals/Labs  Ht: 170 cm (66.93\"); Wt: 69.8 kg (153 lb 14.1 oz)  Temp Readings from Last 1 Encounters:   07/30/24 98.4 °F (36.9 °C) (Oral)    Estimated Creatinine Clearance: 152.5 mL/min (A) (by C-G formula based on SCr of 0.68 mg/dL (L)).        Results from last 7 days   Lab Units 07/30/24  0318 07/28/24  2349 07/24/24  0550   CREATININE mg/dL 0.68* 0.65*  --    WBC 10*3/mm3 18.60* 12.99* 13.15*     Assessment/Plan:    Vancomycin Dose:   1250 mg IV every  12  hours  Predictive AUC level for the dose ordered is 461 mg/L.hr, which is within the target of 400-600 mg/L.hr  Vanc Trough has been ordered for 8/1  at 0930     Pharmacy will follow patient's kidney function and will adjust doses and obtain levels as necessary. Thank you for involving pharmacy in this patient's care. Please contact pharmacy with any questions or concerns.                           Peyton Hickey, PharmD  Clinical Pharmacist    "

## 2024-07-30 NOTE — PLAN OF CARE
Goal Outcome Evaluation:  Plan of Care Reviewed With: patient, family        Progress: improving  Outcome Evaluation: Tmax 99.8, then 98 at midnight. Pt speaks Polish mostly, ipad at bedside for any communications needs. Family in at around midnight, able to communicate with patient and staff. No complaints of pain, tolerating po meds and IV fluid well. WBCs have increased this morning. Urinating well, plan of care ongoing.

## 2024-07-31 VITALS
HEART RATE: 61 BPM | DIASTOLIC BLOOD PRESSURE: 75 MMHG | SYSTOLIC BLOOD PRESSURE: 110 MMHG | OXYGEN SATURATION: 99 % | TEMPERATURE: 98.2 F | RESPIRATION RATE: 14 BRPM | HEIGHT: 67 IN | WEIGHT: 153.88 LBS | BODY MASS INDEX: 24.15 KG/M2

## 2024-07-31 LAB
ANION GAP SERPL CALCULATED.3IONS-SCNC: 13 MMOL/L (ref 5–15)
BASOPHILS # BLD AUTO: 0.07 10*3/MM3 (ref 0–0.2)
BASOPHILS NFR BLD AUTO: 0.6 % (ref 0–1.5)
BUN SERPL-MCNC: 12 MG/DL (ref 6–20)
BUN/CREAT SERPL: 23.5 (ref 7–25)
CALCIUM SPEC-SCNC: 9.3 MG/DL (ref 8.6–10.5)
CHLORIDE SERPL-SCNC: 104 MMOL/L (ref 98–107)
CO2 SERPL-SCNC: 22 MMOL/L (ref 22–29)
CREAT SERPL-MCNC: 0.51 MG/DL (ref 0.76–1.27)
D-LACTATE SERPL-SCNC: 2.1 MMOL/L (ref 0.5–2)
DEPRECATED RDW RBC AUTO: 43.5 FL (ref 37–54)
EGFRCR SERPLBLD CKD-EPI 2021: 137.3 ML/MIN/1.73
EOSINOPHIL # BLD AUTO: 0.13 10*3/MM3 (ref 0–0.4)
EOSINOPHIL NFR BLD AUTO: 1.1 % (ref 0.3–6.2)
ERYTHROCYTE [DISTWIDTH] IN BLOOD BY AUTOMATED COUNT: 13.1 % (ref 12.3–15.4)
GLUCOSE SERPL-MCNC: 91 MG/DL (ref 65–99)
HCT VFR BLD AUTO: 40.9 % (ref 37.5–51)
HGB BLD-MCNC: 13.8 G/DL (ref 13–17.7)
IMM GRANULOCYTES # BLD AUTO: 0.07 10*3/MM3 (ref 0–0.05)
IMM GRANULOCYTES NFR BLD AUTO: 0.6 % (ref 0–0.5)
LYMPHOCYTES # BLD AUTO: 4.58 10*3/MM3 (ref 0.7–3.1)
LYMPHOCYTES NFR BLD AUTO: 37.5 % (ref 19.6–45.3)
MCH RBC QN AUTO: 30.6 PG (ref 26.6–33)
MCHC RBC AUTO-ENTMCNC: 33.7 G/DL (ref 31.5–35.7)
MCV RBC AUTO: 90.7 FL (ref 79–97)
MONOCYTES # BLD AUTO: 0.51 10*3/MM3 (ref 0.1–0.9)
MONOCYTES NFR BLD AUTO: 4.2 % (ref 5–12)
NEUTROPHILS NFR BLD AUTO: 56 % (ref 42.7–76)
NEUTROPHILS NFR BLD AUTO: 6.86 10*3/MM3 (ref 1.7–7)
NRBC BLD AUTO-RTO: 0 /100 WBC (ref 0–0.2)
PLATELET # BLD AUTO: 443 10*3/MM3 (ref 140–450)
PMV BLD AUTO: 9.4 FL (ref 6–12)
POTASSIUM SERPL-SCNC: 3.7 MMOL/L (ref 3.5–5.2)
RBC # BLD AUTO: 4.51 10*6/MM3 (ref 4.14–5.8)
SODIUM SERPL-SCNC: 139 MMOL/L (ref 136–145)
WBC NRBC COR # BLD AUTO: 12.22 10*3/MM3 (ref 3.4–10.8)

## 2024-07-31 PROCEDURE — 83605 ASSAY OF LACTIC ACID: CPT | Performed by: STUDENT IN AN ORGANIZED HEALTH CARE EDUCATION/TRAINING PROGRAM

## 2024-07-31 PROCEDURE — 25810000003 SODIUM CHLORIDE 0.9 % SOLUTION: Performed by: INTERNAL MEDICINE

## 2024-07-31 PROCEDURE — 85025 COMPLETE CBC W/AUTO DIFF WBC: CPT | Performed by: INTERNAL MEDICINE

## 2024-07-31 PROCEDURE — G0378 HOSPITAL OBSERVATION PER HR: HCPCS

## 2024-07-31 PROCEDURE — 25010000002 CEFTRIAXONE PER 250 MG: Performed by: STUDENT IN AN ORGANIZED HEALTH CARE EDUCATION/TRAINING PROGRAM

## 2024-07-31 PROCEDURE — 80048 BASIC METABOLIC PNL TOTAL CA: CPT | Performed by: INTERNAL MEDICINE

## 2024-07-31 PROCEDURE — 25810000003 SODIUM CHLORIDE 0.9 % SOLUTION 250 ML FLEX CONT: Performed by: STUDENT IN AN ORGANIZED HEALTH CARE EDUCATION/TRAINING PROGRAM

## 2024-07-31 PROCEDURE — 25010000002 VANCOMYCIN HCL 1.25 G RECONSTITUTED SOLUTION 1 EACH VIAL: Performed by: STUDENT IN AN ORGANIZED HEALTH CARE EDUCATION/TRAINING PROGRAM

## 2024-07-31 RX ORDER — CEFDINIR 300 MG/1
300 CAPSULE ORAL 2 TIMES DAILY
Qty: 6 CAPSULE | Refills: 0 | Status: SHIPPED | OUTPATIENT
Start: 2024-07-31 | End: 2024-08-03

## 2024-07-31 RX ORDER — DOXYCYCLINE HYCLATE 100 MG/1
100 CAPSULE ORAL 2 TIMES DAILY
Qty: 10 CAPSULE | Refills: 0 | Status: SHIPPED | OUTPATIENT
Start: 2024-07-31 | End: 2024-08-05

## 2024-07-31 RX ADMIN — VANCOMYCIN HYDROCHLORIDE 1250 MG: 1.25 INJECTION, POWDER, LYOPHILIZED, FOR SOLUTION INTRAVENOUS at 08:51

## 2024-07-31 RX ADMIN — FAMOTIDINE 40 MG: 20 TABLET, FILM COATED ORAL at 08:51

## 2024-07-31 RX ADMIN — SODIUM CHLORIDE 100 ML/HR: 9 INJECTION, SOLUTION INTRAVENOUS at 03:25

## 2024-07-31 RX ADMIN — CEFTRIAXONE 2000 MG: 2 INJECTION, POWDER, FOR SOLUTION INTRAMUSCULAR; INTRAVENOUS at 05:43

## 2024-07-31 RX ADMIN — GUAIFENESIN 1200 MG: 600 TABLET, EXTENDED RELEASE ORAL at 08:51

## 2024-07-31 RX ADMIN — Medication 10 ML: at 08:51

## 2024-07-31 NOTE — DISCHARGE SUMMARY
Patient Name: Edin Alarcon  : 1990  MRN: [id number]    Date of Admission: 2024  Date of Discharge:  2024  Primary Care Physician: Provider, No Known      Chief Complaint:   Shortness of Breath      Discharge Diagnoses     Active Hospital Problems    Diagnosis  POA    **Dyspnea [R06.00]  Yes    Lower respiratory tract infection [J22]  Yes    Hyperglycemia [R73.9]  Yes    History of substance use [Z87.898]  Not Applicable    History of alcohol use [Z87.898]  Not Applicable      Resolved Hospital Problems   No resolved problems to display.        Hospital Course     33 years old  gentleman with no significant past medical history who was recently hospitalized with alcohol and illicit substance intoxication and treated for pneumonia. Sputum at that time revealed Staph aureus. Blood cultures at that time were negative .     Lower respiratory tract infection/pneumonia.  Initial chest x-ray with no acute findings.  Normal procalcitonin/normal proBNP.  The plan check blood cultures.  Lactic acid was elevated at 2.7 on admission.   CT scan of the chest without contrast showed small amount of posterior dependent and bibasilar subsegmental atelectasis, otherwise no acute findings.. Legionella and Streptococcus antigen.  Sputum culture was obtained collateral also rejected due to oropharyngeal contamination.  Was initiated on IV ceftriaxone,  IV vancomycin was added on 24 as WBC trended up to 18.60 lactic acid trended up to 4.0.  WBC improved, lactic acid improved.  Lactic acid was barely above normal at 2.1, however per chart review patient with chronically elevated lactic acid but has never normalized during previous admission.  Although initial chest x-ray and CT of the chest without contrast did not show acute findings, and CT scan of the chest was obtained without contrast, suspect primary symptoms were due to pneumonia as patient complained of dyspnea, and productive cough,  and symptoms improved with IV antibiotics.  Discharged on cefdinir and doxycycline.         .     Hyperglycemia.  -Hemoglobin A1c 6.0, consistent with prediabetes.  Counseled on lifestyle modifications, weight loss and diet.     Alcohol and substance use.   - Patient states that he does not use his alcohol or illicit substance on regular basis.  Counseled on cessation.           Day of Discharge     Subjective:   used for communication.  Laying in bed, no acute events overnight.  Reports he is feeling better.  Denies complaints.  No shortness of breath.  No fevers no chills.  On room air.    Physical Exam:  Temp:  [97.7 °F (36.5 °C)-99.1 °F (37.3 °C)] 98.2 °F (36.8 °C)  Heart Rate:  [61-83] 61  Resp:  [14-20] 14  BP: (110-134)/(75-84) 110/75  Body mass index is 24.15 kg/m².  Physical Exam  General: Alert and oriented x3, no acute distress.  HEENT: Normocephalic, atraumatic  Eyes: PERRL, EOMI, anicteric sclerae  Lungs: Clear to auscultation bilaterally, no crackles or wheezes  CV: Regular rate and rhythm, no murmurs rubs or gallops  Abdomen: Soft, nontender, nondistended.  Normoactive bowel sounds  Extremities: No significant peripheral edema  Skin: Clean/dry/intact, no rashes  Neuro: Cranial nerves II through XII intact, no gross focal neurological deficits appreciated  Psych: Appropriate mood and affect    Consultants     Consult Orders (all) (From admission, onward)       Start     Ordered    07/29/24 0546  [facility] (on-call MD unless specified) Details  Once        Specialty:  Hospitalist  Provider:  (Not yet assigned)    07/29/24 0588                  Procedures     * Surgery not found *      Imaging Results (All)       Procedure Component Value Units Date/Time    CT Chest Without Contrast Diagnostic [[id number]] Collected: 07/29/24 1227     Updated: 07/29/24 1236    Narrative:      CT CHEST WO CONTRAST DIAGNOSTIC-     INDICATION: Cough and shortness of breath     COMPARISON: None     TECHNIQUE:  Routine CT  chest without IV contrast. Coronal and sagittal reformats.  Radiation dose reduction techniques were utilized, including automated  exposure control and exposure modulation based on body size.     FINDINGS:      Chest wall: No lymphadenopathy.     Mediastinum: No coronary artery atherosclerotic calcifications seen.  Heart is at upper limits in size. No pericardial effusion. No  mediastinal or hilar lymphadenopathy.     Lungs/pleura: No effusions. Respiratory motion artifact. Patent central  airways. Small amount of posterior dependent and bibasilar subsegmental  atelectasis.     Upper abdomen: Unremarkable     Osseous structures: No destructive osseous lesions.       Impression:         1. No acute findings identified in the chest.  2. Heart is at upper limits in size.  3. Small amount of posterior dependent and bibasilar subsegmental  atelectasis.  4. Motion limited exam     This report was finalized on 7/29/2024 12:32 PM by Dr. Andre Peraza M.D on Workstation: [id number]       XR Chest 1 View [[id number]] Collected: 07/29/24 0135     Updated: 07/29/24 0135    Narrative:        Patient: TEE MURPHY  Time Out: 01:34  Exam(s): XR CXR 1 VIEW     EXAM:    XR Chest, 1 View    CLINICAL HISTORY:     Reason for exam: SOA Triage Protocol.    TECHNIQUE:    Frontal view of the chest.    COMPARISON:    7 24 2024    FINDINGS:    Lungs:  No consolidation. No overt edema.    Pleural space:  No pleural effusion. No pneumothorax.    Heart:  Unremarkable.  No cardiomegaly.    IMPRESSION:         No acute cardiopulmonary abnormality.      Impression:          Electronically signed by Juliocesar Bullock MD on 07-29-24 at 0134              Pertinent Labs     Results from last 7 days   Lab Units 07/31/24  0215 07/30/24  0318 07/28/24  2349   WBC 10*3/mm3 12.22* 18.60* 12.99*   HEMOGLOBIN g/dL 13.8 13.4 14.4   PLATELETS 10*3/mm3 443 445 431     Results from last 7 days   Lab Units 07/31/24  0215 07/30/24  0318  "07/28/24  2349   SODIUM mmol/L 139 139 138   POTASSIUM mmol/L 3.7 3.9 3.8   CHLORIDE mmol/L 104 105 102   CO2 mmol/L 22.0 22.6 24.0   BUN mg/dL 12 10 8   CREATININE mg/dL 0.51* 0.68* 0.65*   GLUCOSE mg/dL 91 100* 161*   Estimated Creatinine Clearance: 203.4 mL/min (A) (by C-G formula based on SCr of 0.51 mg/dL (L)).  Results from last 7 days   Lab Units 07/28/24  2349   ALBUMIN g/dL 4.4   BILIRUBIN mg/dL 0.2   ALK PHOS U/L 98   AST (SGOT) U/L 27   ALT (SGPT) U/L 38     Results from last 7 days   Lab Units 07/31/24  0215 07/30/24  0318 07/28/24  2349   CALCIUM mg/dL 9.3 9.1 9.4   ALBUMIN g/dL  --   --  4.4       Results from last 7 days   Lab Units 07/28/24  2349   HSTROP T ng/L 8   PROBNP pg/mL <36.0           Invalid input(s): \"LDLCALC\"  Results from last 7 days   Lab Units 07/29/24  1137 07/29/24  0040   BLOODCX   --  No growth at 2 days  No growth at 2 days   RESPCX  Rejected  --      Results from last 7 days   Lab Units 07/29/24  0005   COVID19  Not Detected       Test Results Pending at Discharge     Pending Labs       Order Current Status    Blood Culture - Blood, Arm, Left Preliminary result    Blood Culture - Blood, Arm, Right Preliminary result            Discharge Details        Discharge Medications        New Medications        Instructions Start Date   cefdinir 300 MG capsule  Commonly known as: OMNICEF   300 mg, Oral, 2 Times Daily      doxycycline 100 MG capsule  Commonly known as: VIBRAMYCIN   100 mg, Oral, 2 Times Daily             Stop These Medications      amoxicillin 250 MG capsule  Commonly known as: AMOXIL              No Known Allergies    Discharge Disposition:  Home or Self Care      Discharge Diet:  Diet Order   Procedures    Diet: Regular/House; Fluid Consistency: Thin (IDDSI 0)       Discharge Activity:       CODE STATUS:    Code Status and Medical Interventions: CPR (Attempt to Resuscitate); Full Support   Ordered at: 07/29/24 1009     Code Status (Patient has no pulse and is not " breathing):    CPR (Attempt to Resuscitate)     Medical Interventions (Patient has pulse or is breathing):    Full Support       No future appointments.   Follow-up Information       Provider, No Known. Schedule an appointment as soon as possible for a visit in 1 week(s).    Contact information:  [facility] [address]  [phone]                             Time Spent on Discharge:  Greater than 30 minutes      Treasure Guzman MD  [facility]  07/31/24  12:09 EDT

## 2024-07-31 NOTE — PLAN OF CARE
Goal Outcome Evaluation:  Plan of Care Reviewed With: patient, family        Progress: no change  Outcome Evaluation: VSS, tmax 99.8po. No complaints of fever or cough. Continues on IVF and IV abx, no other meds this shift. Vanc trough scheduled for this am. Up to BR independently. Plan of care ongoing.

## 2024-07-31 NOTE — PLAN OF CARE
Goal Outcome Evaluation:  Plan of Care Reviewed With: patient        Progress: no change  Patient discharged home with family

## 2024-07-31 NOTE — CASE MANAGEMENT/SOCIAL WORK
Case Management Discharge Note      Final Note: Home with family. Denies any needs. Pt given financial assistance application and clinic list. Transport by family         Selected Continued Care - Discharged on 7/31/2024 Admission date: 7/28/2024 - Discharge disposition: Home or Self Care      Destination    No services have been selected for the patient.                Durable Medical Equipment    No services have been selected for the patient.                Dialysis/Infusion    No services have been selected for the patient.                Home Medical Care    No services have been selected for the patient.                Therapy    No services have been selected for the patient.                Community Resources    No services have been selected for the patient.                Community & DME    No services have been selected for the patient.                    Transportation Services  Private: Car    Final Discharge Disposition Code: 01 - home or self-care

## 2024-08-03 LAB
BACTERIA SPEC AEROBE CULT: NORMAL
BACTERIA SPEC AEROBE CULT: NORMAL